# Patient Record
Sex: FEMALE | Race: WHITE | NOT HISPANIC OR LATINO | Employment: OTHER | URBAN - METROPOLITAN AREA
[De-identification: names, ages, dates, MRNs, and addresses within clinical notes are randomized per-mention and may not be internally consistent; named-entity substitution may affect disease eponyms.]

---

## 2023-12-30 ENCOUNTER — APPOINTMENT (OUTPATIENT)
Dept: GENERAL RADIOLOGY | Facility: HOSPITAL | Age: 63
End: 2023-12-30
Payer: COMMERCIAL

## 2023-12-30 ENCOUNTER — ANESTHESIA (OUTPATIENT)
Dept: PERIOP | Facility: HOSPITAL | Age: 63
End: 2023-12-30
Payer: COMMERCIAL

## 2023-12-30 ENCOUNTER — ANESTHESIA EVENT (OUTPATIENT)
Dept: PERIOP | Facility: HOSPITAL | Age: 63
End: 2023-12-30
Payer: COMMERCIAL

## 2023-12-30 ENCOUNTER — HOSPITAL ENCOUNTER (INPATIENT)
Facility: HOSPITAL | Age: 63
LOS: 2 days | Discharge: HOME | End: 2024-01-01
Attending: FAMILY MEDICINE | Admitting: INTERNAL MEDICINE
Payer: COMMERCIAL

## 2023-12-30 DIAGNOSIS — N20.1 RIGHT URETERAL STONE: Primary | ICD-10-CM

## 2023-12-30 PROBLEM — A41.9 SEPSIS: Status: ACTIVE | Noted: 2023-12-30

## 2023-12-30 LAB
ALBUMIN SERPL-MCNC: 2.7 G/DL (ref 3.5–5.2)
ALBUMIN/GLOB SERPL: 0.8 G/DL
ALP SERPL-CCNC: 119 U/L (ref 39–117)
ALT SERPL W P-5'-P-CCNC: 29 U/L (ref 1–33)
ANION GAP SERPL CALCULATED.3IONS-SCNC: 9.1 MMOL/L (ref 5–15)
AST SERPL-CCNC: 65 U/L (ref 1–32)
BACTERIA UR QL AUTO: ABNORMAL /HPF
BASOPHILS # BLD AUTO: 0.04 10*3/MM3 (ref 0–0.2)
BASOPHILS NFR BLD AUTO: 0.3 % (ref 0–1.5)
BILIRUB SERPL-MCNC: 1.9 MG/DL (ref 0–1.2)
BILIRUB UR QL STRIP: NEGATIVE
BUN SERPL-MCNC: 13 MG/DL (ref 8–23)
BUN/CREAT SERPL: 10 (ref 7–25)
CALCIUM SPEC-SCNC: 8.1 MG/DL (ref 8.6–10.5)
CHLORIDE SERPL-SCNC: 105 MMOL/L (ref 98–107)
CLARITY UR: ABNORMAL
CO2 SERPL-SCNC: 21.9 MMOL/L (ref 22–29)
COLOR UR: ABNORMAL
CREAT SERPL-MCNC: 1.3 MG/DL (ref 0.57–1)
D-LACTATE SERPL-SCNC: 1.5 MMOL/L (ref 0.5–2)
DEPRECATED RDW RBC AUTO: 51.7 FL (ref 37–54)
EGFRCR SERPLBLD CKD-EPI 2021: 46.3 ML/MIN/1.73
EOSINOPHIL # BLD AUTO: 0.04 10*3/MM3 (ref 0–0.4)
EOSINOPHIL NFR BLD AUTO: 0.3 % (ref 0.3–6.2)
ERYTHROCYTE [DISTWIDTH] IN BLOOD BY AUTOMATED COUNT: 14.3 % (ref 12.3–15.4)
GLOBULIN UR ELPH-MCNC: 3.5 GM/DL
GLUCOSE SERPL-MCNC: 113 MG/DL (ref 65–99)
GLUCOSE UR STRIP-MCNC: NEGATIVE MG/DL
HCT VFR BLD AUTO: 42.6 % (ref 34–46.6)
HGB BLD-MCNC: 14 G/DL (ref 12–15.9)
HGB UR QL STRIP.AUTO: ABNORMAL
HYALINE CASTS UR QL AUTO: ABNORMAL /LPF
IMM GRANULOCYTES # BLD AUTO: 0.06 10*3/MM3 (ref 0–0.05)
IMM GRANULOCYTES NFR BLD AUTO: 0.4 % (ref 0–0.5)
KETONES UR QL STRIP: NEGATIVE
LEUKOCYTE ESTERASE UR QL STRIP.AUTO: ABNORMAL
LYMPHOCYTES # BLD AUTO: 1.37 10*3/MM3 (ref 0.7–3.1)
LYMPHOCYTES NFR BLD AUTO: 9.1 % (ref 19.6–45.3)
MCH RBC QN AUTO: 31.9 PG (ref 26.6–33)
MCHC RBC AUTO-ENTMCNC: 32.9 G/DL (ref 31.5–35.7)
MCV RBC AUTO: 97 FL (ref 79–97)
MONOCYTES # BLD AUTO: 1.16 10*3/MM3 (ref 0.1–0.9)
MONOCYTES NFR BLD AUTO: 7.7 % (ref 5–12)
NEUTROPHILS NFR BLD AUTO: 12.43 10*3/MM3 (ref 1.7–7)
NEUTROPHILS NFR BLD AUTO: 82.2 % (ref 42.7–76)
NITRITE UR QL STRIP: NEGATIVE
NRBC BLD AUTO-RTO: 0 /100 WBC (ref 0–0.2)
PH UR STRIP.AUTO: 6 [PH] (ref 5–8)
PLATELET # BLD AUTO: 106 10*3/MM3 (ref 140–450)
PMV BLD AUTO: 10.7 FL (ref 6–12)
POTASSIUM SERPL-SCNC: 3.9 MMOL/L (ref 3.5–5.2)
PROCALCITONIN SERPL-MCNC: 0.77 NG/ML (ref 0–0.25)
PROT SERPL-MCNC: 6.2 G/DL (ref 6–8.5)
PROT UR QL STRIP: ABNORMAL
RBC # BLD AUTO: 4.39 10*6/MM3 (ref 3.77–5.28)
RBC # UR STRIP: ABNORMAL /HPF
REF LAB TEST METHOD: ABNORMAL
SODIUM SERPL-SCNC: 136 MMOL/L (ref 136–145)
SP GR UR STRIP: 1.02 (ref 1–1.03)
SQUAMOUS #/AREA URNS HPF: ABNORMAL /HPF
UROBILINOGEN UR QL STRIP: ABNORMAL
WBC # UR STRIP: ABNORMAL /HPF
WBC NRBC COR # BLD AUTO: 15.1 10*3/MM3 (ref 3.4–10.8)

## 2023-12-30 PROCEDURE — 25010000002 ONDANSETRON PER 1 MG: Performed by: FAMILY MEDICINE

## 2023-12-30 PROCEDURE — 99222 1ST HOSP IP/OBS MODERATE 55: CPT | Performed by: UROLOGY

## 2023-12-30 PROCEDURE — 25010000002 CEFTRIAXONE PER 250 MG: Performed by: UROLOGY

## 2023-12-30 PROCEDURE — 87086 URINE CULTURE/COLONY COUNT: CPT | Performed by: UROLOGY

## 2023-12-30 PROCEDURE — 83605 ASSAY OF LACTIC ACID: CPT | Performed by: FAMILY MEDICINE

## 2023-12-30 PROCEDURE — 87040 BLOOD CULTURE FOR BACTERIA: CPT | Performed by: FAMILY MEDICINE

## 2023-12-30 PROCEDURE — 0T768DZ DILATION OF RIGHT URETER WITH INTRALUMINAL DEVICE, VIA NATURAL OR ARTIFICIAL OPENING ENDOSCOPIC: ICD-10-PCS | Performed by: UROLOGY

## 2023-12-30 PROCEDURE — 25010000002 MORPHINE PER 10 MG: Performed by: UROLOGY

## 2023-12-30 PROCEDURE — 25010000002 ONDANSETRON PER 1 MG: Performed by: UROLOGY

## 2023-12-30 PROCEDURE — 52332 CYSTOSCOPY AND TREATMENT: CPT | Performed by: UROLOGY

## 2023-12-30 PROCEDURE — 80053 COMPREHEN METABOLIC PANEL: CPT | Performed by: FAMILY MEDICINE

## 2023-12-30 PROCEDURE — C1769 GUIDE WIRE: HCPCS | Performed by: UROLOGY

## 2023-12-30 PROCEDURE — 25010000002 PROPOFOL 10 MG/ML EMULSION: Performed by: NURSE ANESTHETIST, CERTIFIED REGISTERED

## 2023-12-30 PROCEDURE — C1758 CATHETER, URETERAL: HCPCS | Performed by: UROLOGY

## 2023-12-30 PROCEDURE — 85025 COMPLETE CBC W/AUTO DIFF WBC: CPT | Performed by: FAMILY MEDICINE

## 2023-12-30 PROCEDURE — 25810000003 LACTATED RINGERS PER 1000 ML: Performed by: NURSE ANESTHETIST, CERTIFIED REGISTERED

## 2023-12-30 PROCEDURE — C2617 STENT, NON-COR, TEM W/O DEL: HCPCS | Performed by: UROLOGY

## 2023-12-30 PROCEDURE — 76000 FLUOROSCOPY <1 HR PHYS/QHP: CPT

## 2023-12-30 PROCEDURE — 25810000003 SODIUM CHLORIDE 0.9 % SOLUTION: Performed by: INTERNAL MEDICINE

## 2023-12-30 PROCEDURE — 84145 PROCALCITONIN (PCT): CPT | Performed by: FAMILY MEDICINE

## 2023-12-30 PROCEDURE — 81001 URINALYSIS AUTO W/SCOPE: CPT | Performed by: UROLOGY

## 2023-12-30 PROCEDURE — 99223 1ST HOSP IP/OBS HIGH 75: CPT | Performed by: INTERNAL MEDICINE

## 2023-12-30 DEVICE — URETERAL STENT
Type: IMPLANTABLE DEVICE | Site: URETER | Status: FUNCTIONAL
Brand: ASCERTA™

## 2023-12-30 RX ORDER — AMOXICILLIN 250 MG
2 CAPSULE ORAL 2 TIMES DAILY
Status: DISCONTINUED | OUTPATIENT
Start: 2023-12-30 | End: 2023-12-31

## 2023-12-30 RX ORDER — SODIUM CHLORIDE, SODIUM LACTATE, POTASSIUM CHLORIDE, CALCIUM CHLORIDE 600; 310; 30; 20 MG/100ML; MG/100ML; MG/100ML; MG/100ML
INJECTION, SOLUTION INTRAVENOUS CONTINUOUS PRN
Status: DISCONTINUED | OUTPATIENT
Start: 2023-12-30 | End: 2023-12-30 | Stop reason: SURG

## 2023-12-30 RX ORDER — ACETAMINOPHEN 650 MG/1
650 SUPPOSITORY RECTAL EVERY 4 HOURS PRN
Status: DISCONTINUED | OUTPATIENT
Start: 2023-12-30 | End: 2024-01-01 | Stop reason: HOSPADM

## 2023-12-30 RX ORDER — PROPOFOL 10 MG/ML
VIAL (ML) INTRAVENOUS CONTINUOUS PRN
Status: DISCONTINUED | OUTPATIENT
Start: 2023-12-30 | End: 2023-12-30 | Stop reason: SURG

## 2023-12-30 RX ORDER — BISACODYL 5 MG/1
5 TABLET, DELAYED RELEASE ORAL DAILY PRN
Status: DISCONTINUED | OUTPATIENT
Start: 2023-12-30 | End: 2023-12-31

## 2023-12-30 RX ORDER — METOPROLOL TARTRATE 1 MG/ML
INJECTION, SOLUTION INTRAVENOUS AS NEEDED
Status: DISCONTINUED | OUTPATIENT
Start: 2023-12-30 | End: 2023-12-30 | Stop reason: SURG

## 2023-12-30 RX ORDER — ACETAMINOPHEN 325 MG/1
650 TABLET ORAL EVERY 4 HOURS PRN
Status: DISCONTINUED | OUTPATIENT
Start: 2023-12-30 | End: 2024-01-01 | Stop reason: HOSPADM

## 2023-12-30 RX ORDER — SODIUM CHLORIDE 0.9 % (FLUSH) 0.9 %
10 SYRINGE (ML) INJECTION AS NEEDED
Status: DISCONTINUED | OUTPATIENT
Start: 2023-12-30 | End: 2024-01-01 | Stop reason: HOSPADM

## 2023-12-30 RX ORDER — PROMETHAZINE HYDROCHLORIDE 25 MG/1
25 SUPPOSITORY RECTAL ONCE AS NEEDED
Status: DISCONTINUED | OUTPATIENT
Start: 2023-12-30 | End: 2023-12-30 | Stop reason: HOSPADM

## 2023-12-30 RX ORDER — PROMETHAZINE HYDROCHLORIDE 12.5 MG/1
25 TABLET ORAL ONCE AS NEEDED
Status: DISCONTINUED | OUTPATIENT
Start: 2023-12-30 | End: 2023-12-30 | Stop reason: HOSPADM

## 2023-12-30 RX ORDER — BISACODYL 10 MG
10 SUPPOSITORY, RECTAL RECTAL DAILY PRN
Status: DISCONTINUED | OUTPATIENT
Start: 2023-12-30 | End: 2023-12-31

## 2023-12-30 RX ORDER — POLYETHYLENE GLYCOL 3350 17 G/17G
17 POWDER, FOR SOLUTION ORAL DAILY PRN
Status: DISCONTINUED | OUTPATIENT
Start: 2023-12-30 | End: 2023-12-31

## 2023-12-30 RX ORDER — ONDANSETRON 2 MG/ML
4 INJECTION INTRAMUSCULAR; INTRAVENOUS ONCE AS NEEDED
Status: DISCONTINUED | OUTPATIENT
Start: 2023-12-30 | End: 2023-12-30 | Stop reason: HOSPADM

## 2023-12-30 RX ORDER — LIDOCAINE HYDROCHLORIDE 20 MG/ML
INJECTION, SOLUTION EPIDURAL; INFILTRATION; INTRACAUDAL; PERINEURAL AS NEEDED
Status: DISCONTINUED | OUTPATIENT
Start: 2023-12-30 | End: 2023-12-30 | Stop reason: SURG

## 2023-12-30 RX ORDER — MECOBALAMIN 5000 MCG
15 TABLET,DISINTEGRATING ORAL DAILY
COMMUNITY

## 2023-12-30 RX ORDER — SODIUM CHLORIDE 9 MG/ML
40 INJECTION, SOLUTION INTRAVENOUS AS NEEDED
Status: DISCONTINUED | OUTPATIENT
Start: 2023-12-30 | End: 2024-01-01 | Stop reason: HOSPADM

## 2023-12-30 RX ORDER — OXYCODONE HYDROCHLORIDE 5 MG/1
5 TABLET ORAL
Status: DISCONTINUED | OUTPATIENT
Start: 2023-12-30 | End: 2023-12-30 | Stop reason: HOSPADM

## 2023-12-30 RX ORDER — ACETAMINOPHEN 160 MG/5ML
650 SOLUTION ORAL EVERY 4 HOURS PRN
Status: DISCONTINUED | OUTPATIENT
Start: 2023-12-30 | End: 2024-01-01 | Stop reason: HOSPADM

## 2023-12-30 RX ORDER — ONDANSETRON 2 MG/ML
4 INJECTION INTRAMUSCULAR; INTRAVENOUS EVERY 6 HOURS PRN
Status: DISCONTINUED | OUTPATIENT
Start: 2023-12-30 | End: 2024-01-01 | Stop reason: HOSPADM

## 2023-12-30 RX ORDER — MAGNESIUM HYDROXIDE 1200 MG/15ML
LIQUID ORAL AS NEEDED
Status: DISCONTINUED | OUTPATIENT
Start: 2023-12-30 | End: 2023-12-30 | Stop reason: HOSPADM

## 2023-12-30 RX ORDER — SODIUM CHLORIDE 0.9 % (FLUSH) 0.9 %
10 SYRINGE (ML) INJECTION EVERY 12 HOURS SCHEDULED
Status: DISCONTINUED | OUTPATIENT
Start: 2023-12-30 | End: 2024-01-01 | Stop reason: HOSPADM

## 2023-12-30 RX ORDER — MEPERIDINE HYDROCHLORIDE 25 MG/ML
12.5 INJECTION INTRAMUSCULAR; INTRAVENOUS; SUBCUTANEOUS
Status: DISCONTINUED | OUTPATIENT
Start: 2023-12-30 | End: 2023-12-30 | Stop reason: HOSPADM

## 2023-12-30 RX ADMIN — LIDOCAINE HYDROCHLORIDE 100 MG: 20 INJECTION, SOLUTION EPIDURAL; INFILTRATION; INTRACAUDAL; PERINEURAL at 12:27

## 2023-12-30 RX ADMIN — Medication 10 ML: at 10:50

## 2023-12-30 RX ADMIN — Medication 10 ML: at 15:49

## 2023-12-30 RX ADMIN — PROPOFOL 50 MG: 10 INJECTION, EMULSION INTRAVENOUS at 12:28

## 2023-12-30 RX ADMIN — ONDANSETRON 4 MG: 2 INJECTION INTRAMUSCULAR; INTRAVENOUS at 17:31

## 2023-12-30 RX ADMIN — Medication 10 ML: at 20:55

## 2023-12-30 RX ADMIN — MORPHINE SULFATE 4 MG: 4 INJECTION, SOLUTION INTRAMUSCULAR; INTRAVENOUS at 17:39

## 2023-12-30 RX ADMIN — CEFTRIAXONE SODIUM 2000 MG: 2 INJECTION, POWDER, FOR SOLUTION INTRAMUSCULAR; INTRAVENOUS at 12:26

## 2023-12-30 RX ADMIN — PROPOFOL 50 MG: 10 INJECTION, EMULSION INTRAVENOUS at 12:27

## 2023-12-30 RX ADMIN — ONDANSETRON 4 MG: 2 INJECTION INTRAMUSCULAR; INTRAVENOUS at 12:37

## 2023-12-30 RX ADMIN — METOPROLOL TARTRATE 3 MG: 1 INJECTION, SOLUTION INTRAVENOUS at 12:53

## 2023-12-30 RX ADMIN — SODIUM CHLORIDE 1000 ML: 9 INJECTION, SOLUTION INTRAVENOUS at 10:48

## 2023-12-30 RX ADMIN — PROPOFOL 125 MCG/KG/MIN: 10 INJECTION, EMULSION INTRAVENOUS at 12:25

## 2023-12-30 RX ADMIN — SODIUM CHLORIDE, POTASSIUM CHLORIDE, SODIUM LACTATE AND CALCIUM CHLORIDE: 600; 310; 30; 20 INJECTION, SOLUTION INTRAVENOUS at 12:06

## 2023-12-30 RX ADMIN — ACETAMINOPHEN 650 MG: 325 TABLET ORAL at 22:07

## 2023-12-30 NOTE — ANESTHESIA POSTPROCEDURE EVALUATION
Patient: Gin Hunter    Procedure Summary       Date: 12/30/23 Room / Location: [facility] OR 07 / [facility] MAIN OR    Anesthesia Start: 1224 Anesthesia Stop: 1251    Procedure: CYSTOSCOPY, RIGHT URETERAL STENT INSERTION (Right: Ureter) Diagnosis:       Right ureteral stone      (Right ureteral stone [N20.1])    Surgeons: Jeaneth Lemon MD Provider: Steven Steven MD    Anesthesia Type: general ASA Status: 2            Anesthesia Type: general    Vitals  Vitals Value Taken Time   /61 12/30/23 1250   Temp 37.6 °C (99.6 °F) 12/30/23 1250   Pulse 105 12/30/23 1255   Resp 22 12/30/23 1250   SpO2 99 % 12/30/23 1255   Vitals shown include unfiled device data.        Post Anesthesia Care and Evaluation    Patient location during evaluation: bedside  Patient participation: complete - patient participated  Level of consciousness: awake and alert  Pain management: adequate    Airway patency: patent  Anesthetic complications: No anesthetic complications  PONV Status: none  Cardiovascular status: acceptable and tachycardic (brief run of tachy to 150s, regular.  Tx with 3 mg lopressor, ekg unchanged from preop)  Respiratory status: acceptable  Hydration status: acceptable    Comments: An Anesthesiologist personally participated in the most demanding procedures (including induction and emergence if applicable) in the anesthesia plan, monitored the course of anesthesia administration at frequent intervals and remained physically present and available for immediate diagnosis and treatment of emergencies.

## 2023-12-30 NOTE — CONSULTS
[facility]   Consult Note    Patient Name: Gin Hunter  : 1960  MRN: [id number]  Primary Care Physician:  Amber Colin APRN  Referring Physician: No Known Provider  Date of admission: 2023    Consults  Subjective   Subjective     Reason for Consult/ Chief Complaint: Right ureteral stone, Acute right pyelonephritis.    History of Present Illness  Gin Hunter is a 63 y.o. female Pt. Admitted sent over from [facility] with severe right flank pain and fever and nausea and vomiting. CT scan at [facility] showed a 6 mm stone obstructing the right kidney at the right proximal ureter.She has not had stones before.  Pt. Is not on any medications.    Review of Systems   10 point review systems reviewed and it is negative.  Personal History     Past Medical History:   Diagnosis Date    Cancer     GERD (gastroesophageal reflux disease)        Past Surgical History:   Procedure Laterality Date     SECTION         Family History: family history includes Cancer in her father and mother; Diabetes in her father; Diabetes type I in her father; Emphysema in her brother; Hypertension in her father and mother; Nephrolithiasis in her daughter. Otherwise pertinent FHx was reviewed and not pertinent to current issue.    Social History:  reports that she has been smoking cigarettes. She started smoking about 45 years ago. She has been smoking an average of 1 pack per day. She has never used smokeless tobacco. She reports current alcohol use of about 2.0 standard drinks of alcohol per week. Drug use questions deferred to the physician.    Home Medications:        Allergies:  Allergies   Allergen Reactions    Penicillin G Anaphylaxis       Objective    Objective     Vitals:  Temp:  [99 °F (37.2 °C)] 99 °F (37.2 °C)  Heart Rate:  [114] 114  Resp:  [20] 20  BP: (91)/(51) 91/51    Physical Exam  Awake alert oriented x 3.  [id number]  VSS  HEENT:  EOMI, oropharynic clear neck is supple.  CV:   RRR  Lungs.  Normal expiration and inspiration without shortness of breath or pain.  ABD:  soft non tender no rebound no guarding no rigidity.  Right CVAT.  None on left.  Ext:  No clubbing cyanosis or edema.  Neuro:  Intact no deficits.    Result Review    Result Review:  I have personally reviewed the results from the time of this admission to 12/30/2023 10:42 EST and agree with these findings:  [x]  Laboratory list / accordion  []  Microbiology  [x]  Radiology  []  EKG/Telemetry   []  Cardiology/Vascular   []  Pathology  []  Old records  []  Other:  Most notable findings include: 6 mm Right proximal ureteral stone.  U/A:  4 + bacteria, 21 to 50 wbc, Nit positive and Leuk est +3.        Assessment & Plan   Assessment / Plan     Brief Patient Summary:  Gin Hunter is a 63 y.o. female who Six mm right proximal ureteral stone with infection and early sepsis.    Active Hospital Problems:  Active Hospital Problems    Diagnosis     **Sepsis      Plan:   Pt. Needs to have cystoscopy and right ureteral stent placement urgently.  I at length discussed with her all the risks benefits alternatives all potential complications of watchful waiting vs.  Proceeding with above mentioned procedure.  She understands our discussion and voices her understanding of our discussion and wishes to proceed with procedure. In the near future when the infection has cleared she will need ureteroscopy and laser litho. She understands that.    Jeaneth Lemon MD

## 2023-12-30 NOTE — H&P
[facility]  [facility]   Patient Name: Gin Hunter  : 1960  MRN: [id number]  Primary Care Physician:  Amber Colin APRN  Date of admission: 2023    Subjective   Subjective     Chief Complaint: Transfer due to obstructive kidney stone    HPI:    Gin Hunter is a 63 y.o. female past medical history of hypertension and obesity who was transferred from outside hospital due to acute kidney stone causing obstructive uropathy    Patient states she is in her normal state of health.  Until last night when she developed right flank pain.  As result she presented to the outside hospital for further evaluation.  She does state having fevers and chills    At [facility], her temperature was 100.4 and she was tachycardic prior the chart.  CBC showed a white blood cell count of 5.6 and a hemoglobin of 14.1.  Her platelets are 98.  CMP shows a creatinine of 0.8, glucose 10, and albumin of 2.4 and a total bilirubin of 1.5.  Urinalysis shows 3+ leuk esterase.  Positive nitrite.  4+ bacteria.  She was given 2 g of ceftriaxone.  It is unclear if she got any fluid resuscitation.  She was transferred to [facility] for management of obstructive stone and urinary sepsis.  Urology was consulted and they will take the patient to the OR for further management.    All systems reviewed abnormalities noted above    Personal History     Past Medical History:  Hypertension    Past Surgical History:   section    Family History:   Family history of kidney stones    Social History:   Smokes daily  2 drinks per week.    Home Medications:       Allergies:  Allergies   Allergen Reactions    Penicillin G Anaphylaxis       Objective   Objective     Vitals:   Temp:  [99 °F (37.2 °C)] 99 °F (37.2 °C)  Heart Rate:  [114] 114  Resp:  [20] 20  BP: (91)/(51) 91/51    Physical Exam    Constitutional: Chronically ill-appearing.   Eyes: Pupils equal, sclerae anicteric, no  conjunctival injection   HENT: NCAT, mucous membranes moist   Neck: Supple, no thyromegaly, no lymphadenopathy, trachea midline   Respiratory: Clear to auscultation bilaterally, nonlabored respirations    Cardiovascular: RRR, no murmurs, rubs, or gallops, palpable pedal pulses bilaterally   Gastrointestinal: Positive bowel sounds, soft, nontender, nondistended.  Right flank pain   Musculoskeletal: Trace edema no clubbing or cyanosis to extremities.  Chronic venous changes    Psychiatric: Appropriate affect, cooperative   Neurologic: Oriented x 3, strength symmetric in all extremities, Cranial Nerves grossly intact to confrontation, speech clear   Skin: No rashes     Result Review    Result Review:  I have personally reviewed the results from the time of this admission to 12/30/2023 10:38 EST and agree with these findings:  Bicarb of 21.9, creatinine 1.30, bili 1.9, Pro-Flavio 0.77  Leukocytosis of 15    Assessment & Plan   Assessment / Plan     Assessment/Plan:   Sepsis due to obstructive uropathy and pyelonephritis (leukocytosis of and tachycardia)  Pyelonephritis  Obstructive uropathy  History of hypertension  Hypoalbuminemia, hyperbilirubinemia and mild thrombocytopenia with no formal diagnosis of cirrhosis of the liver?  Elevated LFTs  Acute kidney injury with baseline creatinine 0.8 currently 1.3    Plan:  --Admit to hospital service  -- Consult urology  -- Continue ceftriaxone 2 g daily for concern of bacteremia  -- Blood culture  -- Urine culture  --Right upper ultrasound to rule out cirrhosis  -- IV morphine for pain control  -- 2 L of normal saline bolus  -- Avoid nephrotoxic agents  -- Repeat CMP in the morning        DVT prophylaxis:  Lovenox    CODE STATUS:    Code Status (Patient has no pulse and is not breathing): CPR (Attempt to Resuscitate)  Medical Interventions (Patient has pulse or is breathing): Full Support      Admission Status:  I believe this patient meets admission status.    Electronically  signed by Genaro Pierce MD, 12/30/23, 10:38 AM EST.

## 2023-12-30 NOTE — OP NOTE
CYSTOSCOPY URETERAL CATHETER/STENT INSERTION  Procedure Report    Patient Name:  Gin Hunter  YOB: 1960    Date of Surgery:  12/30/2023     Indications: 7 mm right proximal ureteral stone, acute right pyelonephritis    Pre-op Diagnosis:   Right ureteral stone [N20.1]       Post-Op Diagnosis Codes:     * Right ureteral stone [N20.1]    Procedure/CPT® Codes:      Procedure(s):  CYSTOSCOPY, RIGHT URETERAL STENT INSERTION        Staff:  Surgeon(s):  Jeaneth Lemon MD         Anesthesia: General    Estimated Blood Loss: 0 mL    Implants:    Implant Name Type Inv. Item Serial No.  Lot No. LRB No. Used Action   STNT URETRL ASCERTA 6F 26CM - [id number] Stent STNT URETRL ASCERTA 6F 26CM  [employer] [id number] Right 1 Implanted       Specimen:          None        Findings: High-grade obstruction right side with 7 mm right proximal ureteral stone    Complications: None    Description of Procedure: Indication for procedure: Patient was admitted yesterday to [facility] with severe right flank pain nausea vomiting and fevers and chills.  Patient was soon transferred to [facility] where she arrived this morning.  We are going to place a right ureteral stent.  Description of procedure: After informed consent was obtained patient was taken to the operating room.  General anesthesia was administered.  Patient was placed in a dorsolithotomy position.  Groin is prepped and draped in a sterile fashion.  Cystoscopy is performed bladder is filled with very purulent urine were visibility was very poor I had to flush the bladder out a couple of times in order to gain good visibility both ureteral orifices were normal.  As was the bladder.  Fluoroscopy showed high-grade obstruction on the right side with a right renal pelvis was filled with contrast with a column of contrast culminating to the proximal ureteral stone on the right side.  The ureteral catheter was passed into the right  distal ureter a sensor tip guidewire was passed through it into the right kidney past the stone.  The ureteral catheter was removed and a 6 [place] by 24 cm stent was passed fluoroscopy showed that the stent was not long enough as the proximal end was buried in the renal pelvis so I decided to remove the 24 cm stent and instead passed a 6 [place] by 26 cm stent into the right kidney fluoroscopy showed excellent position of the proximal and cystoscopy showed the distal end to be in good shape in the bladder.  Bladder was drained cystoscope was removed patient tolerated the procedure well there were no complications she was stable awake alert in good condition when transported to recovery room.                  Jeaneth Lemon MD     Date: 12/30/2023  Time: 12:44 EST

## 2023-12-30 NOTE — PROGRESS NOTES
Patient is a 63-year-old female who presented to [facility] complaining of a 2-day history of increasing right flank pain, dysuria, fever and chills.  Past medical history of hypertension.  In the ED there her urine analysis showed 4+ bacteria 3+ leukocyte esterase 3+ blood with a axillary temp of 100.4.  Patient CT scan of the abdomen and pelvis showed a 6 mm proximal right ureteral stone causing moderate hydro.  Creatinine was not bumped at 0.8.  She received 2 g of Rocephin..  The emergency room physician spoke with urology who asked to be notified as soon as she arrives so that he can take to surgery  Patient's vital signs are otherwise stable

## 2023-12-30 NOTE — ANESTHESIA PREPROCEDURE EVALUATION
Anesthesia Evaluation     Patient summary reviewed and Nursing notes reviewed   no history of anesthetic complications:   NPO Solid Status: > 8 hours  NPO Liquid Status: > 2 hours           Airway   Mallampati: II  TM distance: >3 FB  Neck ROM: full  No difficulty expected  Dental    (+) poor dentition        Pulmonary - negative pulmonary ROS and normal exam    breath sounds clear to auscultation  Cardiovascular - normal exam  Exercise tolerance: good (4-7 METS)    Rhythm: regular  Rate: normal        Neuro/Psych- negative ROS  GI/Hepatic/Renal/Endo    (+) GERD well controlled    Musculoskeletal (-) negative ROS    Abdominal    Substance History - negative use     OB/GYN negative ob/gyn ROS         Other      history of cancer    ROS/Med Hx Other:                  Anesthesia Plan    ASA 2     general   total IV anesthesia  (Patient understands anesthesia not responsible for dental damage.  12/30/23 10:44  WBC: 15.10 (H)  RBC: 4.39  Hemoglobin: 14.0  Hematocrit: 42.6  Platelets: 106 (L)      (H): Data is abnormally high  (L): Data is abnormally low)  intravenous induction     Anesthetic plan, risks, benefits, and alternatives have been provided, discussed and informed consent has been obtained with: patient.  Pre-procedure education provided  Plan discussed with CRNA.      CODE STATUS:    Code Status (Patient has no pulse and is not breathing): CPR (Attempt to Resuscitate)  Medical Interventions (Patient has pulse or is breathing): Full Support

## 2023-12-30 NOTE — PLAN OF CARE
Goal Outcome Evaluation:  Plan of Care Reviewed With: patient           Outcome Evaluation: Patient came back to the floor in afternoon once stent was placed. Pain treated as needed. She voids without discomfort. slightly elevated temp throughout the shift. Continue to monitor.

## 2023-12-31 ENCOUNTER — APPOINTMENT (OUTPATIENT)
Dept: ULTRASOUND IMAGING | Facility: HOSPITAL | Age: 63
End: 2023-12-31
Payer: COMMERCIAL

## 2023-12-31 ENCOUNTER — APPOINTMENT (OUTPATIENT)
Dept: GENERAL RADIOLOGY | Facility: HOSPITAL | Age: 63
End: 2023-12-31
Payer: COMMERCIAL

## 2023-12-31 PROBLEM — N17.9 AKI (ACUTE KIDNEY INJURY): Status: ACTIVE | Noted: 2023-12-31

## 2023-12-31 PROBLEM — N10 ACUTE PYELONEPHRITIS: Status: ACTIVE | Noted: 2023-12-31

## 2023-12-31 LAB
ALBUMIN SERPL-MCNC: 2.4 G/DL (ref 3.5–5.2)
ALBUMIN/GLOB SERPL: 0.8 G/DL
ALP SERPL-CCNC: 103 U/L (ref 39–117)
ALT SERPL W P-5'-P-CCNC: 23 U/L (ref 1–33)
ANION GAP SERPL CALCULATED.3IONS-SCNC: 7.6 MMOL/L (ref 5–15)
AST SERPL-CCNC: 54 U/L (ref 1–32)
BACTERIA SPEC AEROBE CULT: NO GROWTH
BASOPHILS # BLD AUTO: 0.04 10*3/MM3 (ref 0–0.2)
BASOPHILS NFR BLD AUTO: 0.5 % (ref 0–1.5)
BILIRUB SERPL-MCNC: 1.6 MG/DL (ref 0–1.2)
BUN SERPL-MCNC: 13 MG/DL (ref 8–23)
BUN/CREAT SERPL: 14.3 (ref 7–25)
CALCIUM SPEC-SCNC: 7.9 MG/DL (ref 8.6–10.5)
CHLORIDE SERPL-SCNC: 108 MMOL/L (ref 98–107)
CO2 SERPL-SCNC: 22.4 MMOL/L (ref 22–29)
CREAT SERPL-MCNC: 0.91 MG/DL (ref 0.57–1)
DEPRECATED RDW RBC AUTO: 51.5 FL (ref 37–54)
EGFRCR SERPLBLD CKD-EPI 2021: 71 ML/MIN/1.73
EOSINOPHIL # BLD AUTO: 0.1 10*3/MM3 (ref 0–0.4)
EOSINOPHIL NFR BLD AUTO: 1.2 % (ref 0.3–6.2)
ERYTHROCYTE [DISTWIDTH] IN BLOOD BY AUTOMATED COUNT: 14.3 % (ref 12.3–15.4)
GLOBULIN UR ELPH-MCNC: 3.2 GM/DL
GLUCOSE SERPL-MCNC: 101 MG/DL (ref 65–99)
HCT VFR BLD AUTO: 38.3 % (ref 34–46.6)
HGB BLD-MCNC: 12.4 G/DL (ref 12–15.9)
IMM GRANULOCYTES # BLD AUTO: 0.03 10*3/MM3 (ref 0–0.05)
IMM GRANULOCYTES NFR BLD AUTO: 0.4 % (ref 0–0.5)
LYMPHOCYTES # BLD AUTO: 1.57 10*3/MM3 (ref 0.7–3.1)
LYMPHOCYTES NFR BLD AUTO: 19 % (ref 19.6–45.3)
MAGNESIUM SERPL-MCNC: 1.8 MG/DL (ref 1.6–2.4)
MCH RBC QN AUTO: 31.8 PG (ref 26.6–33)
MCHC RBC AUTO-ENTMCNC: 32.4 G/DL (ref 31.5–35.7)
MCV RBC AUTO: 98.2 FL (ref 79–97)
MONOCYTES # BLD AUTO: 0.85 10*3/MM3 (ref 0.1–0.9)
MONOCYTES NFR BLD AUTO: 10.3 % (ref 5–12)
NEUTROPHILS NFR BLD AUTO: 5.68 10*3/MM3 (ref 1.7–7)
NEUTROPHILS NFR BLD AUTO: 68.6 % (ref 42.7–76)
NRBC BLD AUTO-RTO: 0 /100 WBC (ref 0–0.2)
PLATELET # BLD AUTO: 85 10*3/MM3 (ref 140–450)
PMV BLD AUTO: 10.7 FL (ref 6–12)
POTASSIUM SERPL-SCNC: 3.6 MMOL/L (ref 3.5–5.2)
PROT SERPL-MCNC: 5.6 G/DL (ref 6–8.5)
RBC # BLD AUTO: 3.9 10*6/MM3 (ref 3.77–5.28)
SODIUM SERPL-SCNC: 138 MMOL/L (ref 136–145)
WBC NRBC COR # BLD AUTO: 8.27 10*3/MM3 (ref 3.4–10.8)

## 2023-12-31 PROCEDURE — 85025 COMPLETE CBC W/AUTO DIFF WBC: CPT | Performed by: INTERNAL MEDICINE

## 2023-12-31 PROCEDURE — 80053 COMPREHEN METABOLIC PANEL: CPT | Performed by: INTERNAL MEDICINE

## 2023-12-31 PROCEDURE — 76705 ECHO EXAM OF ABDOMEN: CPT

## 2023-12-31 PROCEDURE — 99231 SBSQ HOSP IP/OBS SF/LOW 25: CPT | Performed by: UROLOGY

## 2023-12-31 PROCEDURE — 71046 X-RAY EXAM CHEST 2 VIEWS: CPT

## 2023-12-31 PROCEDURE — 83735 ASSAY OF MAGNESIUM: CPT | Performed by: INTERNAL MEDICINE

## 2023-12-31 PROCEDURE — 25010000002 ONDANSETRON PER 1 MG: Performed by: UROLOGY

## 2023-12-31 PROCEDURE — 25010000002 KETOROLAC TROMETHAMINE PER 15 MG: Performed by: INTERNAL MEDICINE

## 2023-12-31 PROCEDURE — 25010000002 MORPHINE PER 10 MG: Performed by: UROLOGY

## 2023-12-31 PROCEDURE — 99232 SBSQ HOSP IP/OBS MODERATE 35: CPT | Performed by: INTERNAL MEDICINE

## 2023-12-31 PROCEDURE — 25010000002 CEFTRIAXONE PER 250 MG: Performed by: UROLOGY

## 2023-12-31 PROCEDURE — 25010000002 HEPARIN (PORCINE) PER 1000 UNITS: Performed by: INTERNAL MEDICINE

## 2023-12-31 RX ORDER — KETOROLAC TROMETHAMINE 30 MG/ML
15 INJECTION, SOLUTION INTRAMUSCULAR; INTRAVENOUS EVERY 6 HOURS PRN
Status: DISCONTINUED | OUTPATIENT
Start: 2023-12-31 | End: 2024-01-01 | Stop reason: HOSPADM

## 2023-12-31 RX ORDER — AZITHROMYCIN 250 MG/1
500 TABLET, FILM COATED ORAL DAILY
Status: COMPLETED | OUTPATIENT
Start: 2023-12-31 | End: 2023-12-31

## 2023-12-31 RX ORDER — POLYETHYLENE GLYCOL 3350 17 G/17G
17 POWDER, FOR SOLUTION ORAL DAILY PRN
Status: DISCONTINUED | OUTPATIENT
Start: 2023-12-31 | End: 2024-01-01 | Stop reason: HOSPADM

## 2023-12-31 RX ORDER — AZITHROMYCIN 250 MG/1
250 TABLET, FILM COATED ORAL DAILY
Status: DISCONTINUED | OUTPATIENT
Start: 2024-01-01 | End: 2024-01-01 | Stop reason: HOSPADM

## 2023-12-31 RX ORDER — BISACODYL 5 MG/1
5 TABLET, DELAYED RELEASE ORAL DAILY PRN
Status: DISCONTINUED | OUTPATIENT
Start: 2023-12-31 | End: 2024-01-01 | Stop reason: HOSPADM

## 2023-12-31 RX ORDER — IBUPROFEN 400 MG/1
400 TABLET ORAL EVERY 4 HOURS PRN
Status: DISCONTINUED | OUTPATIENT
Start: 2023-12-31 | End: 2024-01-01 | Stop reason: HOSPADM

## 2023-12-31 RX ORDER — AMOXICILLIN 250 MG
2 CAPSULE ORAL NIGHTLY PRN
Status: DISCONTINUED | OUTPATIENT
Start: 2023-12-31 | End: 2024-01-01 | Stop reason: HOSPADM

## 2023-12-31 RX ORDER — ALBUTEROL SULFATE 2.5 MG/3ML
2.5 SOLUTION RESPIRATORY (INHALATION) EVERY 6 HOURS PRN
Status: DISCONTINUED | OUTPATIENT
Start: 2023-12-31 | End: 2024-01-01 | Stop reason: HOSPADM

## 2023-12-31 RX ORDER — HEPARIN SODIUM 5000 [USP'U]/ML
5000 INJECTION, SOLUTION INTRAVENOUS; SUBCUTANEOUS EVERY 8 HOURS SCHEDULED
Status: DISCONTINUED | OUTPATIENT
Start: 2023-12-31 | End: 2024-01-01 | Stop reason: HOSPADM

## 2023-12-31 RX ORDER — BISACODYL 10 MG
10 SUPPOSITORY, RECTAL RECTAL DAILY PRN
Status: DISCONTINUED | OUTPATIENT
Start: 2023-12-31 | End: 2024-01-01 | Stop reason: HOSPADM

## 2023-12-31 RX ADMIN — CEFTRIAXONE SODIUM 2000 MG: 2 INJECTION, POWDER, FOR SOLUTION INTRAMUSCULAR; INTRAVENOUS at 06:26

## 2023-12-31 RX ADMIN — KETOROLAC TROMETHAMINE 15 MG: 30 INJECTION, SOLUTION INTRAMUSCULAR; INTRAVENOUS at 11:43

## 2023-12-31 RX ADMIN — HEPARIN SODIUM 5000 UNITS: 5000 INJECTION INTRAVENOUS; SUBCUTANEOUS at 21:43

## 2023-12-31 RX ADMIN — ACETAMINOPHEN 650 MG: 650 SUPPOSITORY RECTAL at 02:20

## 2023-12-31 RX ADMIN — AZITHROMYCIN DIHYDRATE 500 MG: 250 TABLET ORAL at 15:32

## 2023-12-31 RX ADMIN — ONDANSETRON 4 MG: 2 INJECTION INTRAMUSCULAR; INTRAVENOUS at 02:20

## 2023-12-31 RX ADMIN — HEPARIN SODIUM 5000 UNITS: 5000 INJECTION INTRAVENOUS; SUBCUTANEOUS at 15:32

## 2023-12-31 RX ADMIN — MORPHINE SULFATE 4 MG: 4 INJECTION, SOLUTION INTRAMUSCULAR; INTRAVENOUS at 02:20

## 2023-12-31 RX ADMIN — ONDANSETRON 4 MG: 2 INJECTION INTRAMUSCULAR; INTRAVENOUS at 11:43

## 2023-12-31 RX ADMIN — Medication 10 ML: at 21:43

## 2023-12-31 RX ADMIN — Medication 10 ML: at 09:00

## 2023-12-31 NOTE — PROGRESS NOTES
[facility]   Hospitalist Progress Note  Date: 2023  Patient Name: Gin Hunter  : 1960  MRN: [id number]  Date of admission: 2023      Subjective   Subjective     Chief Complaint: Follow up for mgmt of obstructive kidney stone    Summary: 64 y/o F with HTN who was transferred from OSH for mgmt of pyelonephritis and obstructive kidney stone. At OSH, her temperature was 100.4 and she was tachycardic prior the chart.  CBC showed a white blood cell count of 5.6 and a hemoglobin of 14.1.  Her platelets are 98.  CMP shows a creatinine of 0.8, glucose 10, and albumin of 2.4 and a total bilirubin of 1.5.  Urinalysis shows 3+ leuk esterase.  Positive nitrite.  4+ bacteria.  She was given 2 g of ceftriaxone. Underwent cystoscopy with right ureteral stent insertion on .  Awaiting cultures.  Doing better.    Interval Followup:   Fever overnight, 103.1F  BP stable  SpO2 dropped to 87% on room air, now on 2L NC  WBC normalized  Denies fever or chills  Less flank pain  No dysuria or hematuria    Review of Systems  Cardiovascular:  No Chest Pain, No Edema  Respiratory:  +Cough, No Dyspnea, No wheezing  Gastrointestinal:  No Nausea, No Vomiting      Objective   Objective     Vitals:   Temp:  [98.4 °F (36.9 °C)-103.1 °F (39.5 °C)] 98.4 °F (36.9 °C)  Heart Rate:  [] 90  Resp:  [16-22] 16  BP: ()/(46-90) 120/60  Flow (L/min):  [2] 2  Physical Exam    Constitutional: Conversant, NAD   Eyes: Pupils equal and reactive, no conjunctival injections   HENT: NCAT, nares patent, MMM   Respiratory: Clear to auscultation bilaterally, nonlabored respirations    Cardiovascular: RRR, no murmurs, no edema   Gastrointestinal: Positive bowel sounds, soft, nontender, nondistended   Neurologic: Alert, Cranial Nerves grossly intact, speech clear   Skin: Extremities warm, no rashes or wounds    Result Review    Result Review:  I have personally reviewed the following over the last 24 hours (07:00 to 07:00) and  agree with the following findings  [x]  Laboratory  CBC          12/30/2023    10:44 12/31/2023    04:56   CBC   WBC 15.10  8.27    RBC 4.39  3.90    Hemoglobin 14.0  12.4    Hematocrit 42.6  38.3    MCV 97.0  98.2    MCH 31.9  31.8    MCHC 32.9  32.4    RDW 14.3  14.3    Platelets 106  85      CMP          12/30/2023    10:44 12/31/2023    04:56   CMP   Glucose 113  101    BUN 13  13    Creatinine 1.30  0.91    EGFR 46.3  71.0    Sodium 136  138    Potassium 3.9  3.6    Chloride 105  108    Calcium 8.1  7.9    Total Protein 6.2  5.6    Albumin 2.7  2.4    Globulin 3.5  3.2    Total Bilirubin 1.9  1.6    Alkaline Phosphatase 119  103    AST (SGOT) 65  54    ALT (SGPT) 29  23    Albumin/Globulin Ratio 0.8  0.8    BUN/Creatinine Ratio 10.0  14.3    Anion Gap 9.1  7.6      Procalcitonin 0.77    [x]  Microbiology  Blood culture pending  Urine culture pending  [x]  Radiology   []  EKG/Telemetry   []  Cardiology/Vascular   []  Pathology  []  Old records  [x]  Other:    Intake/Output Summary (Last 24 hours) at 12/31/2023 1058  Last data filed at 12/31/2023 0930  Gross per 24 hour   Intake 1320 ml   Output 1350 ml   Net -30 ml         Assessment & Plan   Assessment / Plan     Assessment/Plan:  Sepsis due to obstructive uropathy and pyelonephritis   Pyelonephritis  Obstructive uropathy  Hypoxia  Left lower lobe pneumonia from unspecified organism  Lifelong smoker  History of hypertension  Hypoalbuminemia, hyperbilirubinemia and mild thrombocytopenia with no formal diagnosis of cirrhosis of the liver?  Elevated LFTs  Acute kidney injury, resolved          Appreciate urology assistance. Will need outpatient follow up in 2-3 weeks.  Fevers coming down. WBC normalized. Blood and urine culture pending  PA/lateral chest x-ray with infiltrate in the left lower lobe with small pleural effusion.  Pro-Flavio 0.77.  Given new oxygen demand with cough and x-ray findings will add azithromycin to complete 5-day course and continue Rocephin 2  g daily  Albuterol nebs q6 hours/ Encourage I-S. Wean supplemental oxygen, SpO2 goal >90%  Monitor fever curve; tylenol q6 hours prn  Cr normalized. Over 1L UOP noted. Switch to IV Toradol 15 mg q6 hours severe pain  Declined NRT  Advance diet as tolerated  Add subcu heparin 5000 units for DVT ppx  CBC, BMP in AM    Discussed plan with RN.    DVT prophylaxis:  Mechanical DVT prophylaxis orders are present.    CODE STATUS:   Code Status (Patient has no pulse and is not breathing): CPR (Attempt to Resuscitate)  Medical Interventions (Patient has pulse or is breathing): Full Support    Electronically signed by Rickey Rollins DO, 12/31/23, 7:42 AM EST.

## 2023-12-31 NOTE — PROGRESS NOTES
[facility]     Progress Note    Patient Name: Gin Hunter  : 1960  MRN: [id number]  Primary Care Physician:  Amber Colin APRN  Date of admission: 2023    Subjective   Subjective     Chief Complaint: She has no complaints today    History of Present Illness  Patient Reports patient came to us from another [facility] with severe right flank pain fevers chills urinary tract infection.  She underwent right ureteral stent placement urgently yesterday.  Postoperatively patient has been doing well her pain is pretty much resolved but she did have a temperature of 103.1.  Currently she is afebrile vital signs are stable.    Review of Systems  Negative  Objective   Objective     Vitals:   Temp:  [98.4 °F (36.9 °C)-103.1 °F (39.5 °C)] 98.4 °F (36.9 °C)  Heart Rate:  [] 90  Resp:  [16-22] 16  BP: ()/(46-90) 120/60  Flow (L/min):  [2] 2    Physical Exam   Awake alert oriented x 3  Afebrile vital signs are stable last night did have a temperature of 103.1.  Abdomen soft nontender nondistended no rebound guarding or rigidity is noted  No CVA tenderness.  Extremities no clubbing cyanosis or edema.  Result Review    Result Review:  I have personally reviewed the results from the time of this admission to 2023 09:24 EST and agree with these findings:  [x]  Laboratory list / accordion  []  Microbiology  []  Radiology  []  EKG/Telemetry   []  Cardiology/Vascular   []  Pathology  []  Old records  []  Other:  Most notable findings include: Creatinine is 0.91 white blood cell count is 8.27      Assessment & Plan   Assessment / Plan     Brief Patient Summary:  Gin Hunter is a 63 y.o. female who patient was septic yesterday from a right proximal ureteral stone and a stent was placed urgently.  Postoperatively she has had temperatures of up to 103.1.  This morning she is doing much better.    Active Hospital Problems:  Active Hospital Problems    Diagnosis     **Sepsis      Acute pyelonephritis     CHAZ (acute kidney injury)     Right ureteral stone      Plan:   I told patient that once she leaves the hospital and the infection is resolved she must follow-up with the urology clinic to have the stone removed and the stent as well.  She understands my instructions and will comply she states.  I did warn her about the potential complications of needing a stent in the urinary tract for very long as she understands that.    DVT prophylaxis:  Mechanical DVT prophylaxis orders are present.    CODE STATUS:    Code Status (Patient has no pulse and is not breathing): CPR (Attempt to Resuscitate)  Medical Interventions (Patient has pulse or is breathing): Full Support    Disposition:  I expect patient to be discharged home.    Jeaneth Lemon MD

## 2023-12-31 NOTE — SIGNIFICANT NOTE
12/31/23 0178   Plan   Plan JOVANNI, RN met with patient at bedside.  CM updated address in EPIC.  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  Reports no financial needs.  Good support if needed.  Patient plans to return home with no needs at this time.

## 2023-12-31 NOTE — PLAN OF CARE
Goal Outcome Evaluation:           Progress: no change  Outcome Evaluation: Fever. Given tylenol twice during shift. Given zofran and morphine prn. No other changes. NPO since midnight for US abd. Will continue plan of care.

## 2024-01-01 ENCOUNTER — READMISSION MANAGEMENT (OUTPATIENT)
Dept: CALL CENTER | Facility: HOSPITAL | Age: 64
End: 2024-01-01
Payer: COMMERCIAL

## 2024-01-01 VITALS
DIASTOLIC BLOOD PRESSURE: 92 MMHG | BODY MASS INDEX: 33.16 KG/M2 | WEIGHT: 194.22 LBS | HEART RATE: 92 BPM | TEMPERATURE: 98.6 F | OXYGEN SATURATION: 93 % | HEIGHT: 64 IN | SYSTOLIC BLOOD PRESSURE: 121 MMHG | RESPIRATION RATE: 18 BRPM

## 2024-01-01 LAB
ANION GAP SERPL CALCULATED.3IONS-SCNC: 7.1 MMOL/L (ref 5–15)
BUN SERPL-MCNC: 13 MG/DL (ref 8–23)
BUN/CREAT SERPL: 15.5 (ref 7–25)
CALCIUM SPEC-SCNC: 8.1 MG/DL (ref 8.6–10.5)
CHLORIDE SERPL-SCNC: 105 MMOL/L (ref 98–107)
CO2 SERPL-SCNC: 22.9 MMOL/L (ref 22–29)
CREAT SERPL-MCNC: 0.84 MG/DL (ref 0.57–1)
DEPRECATED RDW RBC AUTO: 49.6 FL (ref 37–54)
EGFRCR SERPLBLD CKD-EPI 2021: 78.2 ML/MIN/1.73
ERYTHROCYTE [DISTWIDTH] IN BLOOD BY AUTOMATED COUNT: 13.8 % (ref 12.3–15.4)
GLUCOSE SERPL-MCNC: 145 MG/DL (ref 65–99)
HCT VFR BLD AUTO: 38 % (ref 34–46.6)
HGB BLD-MCNC: 12.4 G/DL (ref 12–15.9)
MCH RBC QN AUTO: 31.5 PG (ref 26.6–33)
MCHC RBC AUTO-ENTMCNC: 32.6 G/DL (ref 31.5–35.7)
MCV RBC AUTO: 96.4 FL (ref 79–97)
PLATELET # BLD AUTO: 83 10*3/MM3 (ref 140–450)
PMV BLD AUTO: 11.2 FL (ref 6–12)
POTASSIUM SERPL-SCNC: 3.7 MMOL/L (ref 3.5–5.2)
RBC # BLD AUTO: 3.94 10*6/MM3 (ref 3.77–5.28)
SODIUM SERPL-SCNC: 135 MMOL/L (ref 136–145)
WBC NRBC COR # BLD AUTO: 4.87 10*3/MM3 (ref 3.4–10.8)

## 2024-01-01 PROCEDURE — 25010000002 HEPARIN (PORCINE) PER 1000 UNITS: Performed by: INTERNAL MEDICINE

## 2024-01-01 PROCEDURE — 85027 COMPLETE CBC AUTOMATED: CPT | Performed by: INTERNAL MEDICINE

## 2024-01-01 PROCEDURE — 25010000002 CEFTRIAXONE PER 250 MG: Performed by: UROLOGY

## 2024-01-01 PROCEDURE — 99231 SBSQ HOSP IP/OBS SF/LOW 25: CPT | Performed by: UROLOGY

## 2024-01-01 PROCEDURE — 99239 HOSP IP/OBS DSCHRG MGMT >30: CPT | Performed by: INTERNAL MEDICINE

## 2024-01-01 PROCEDURE — 80048 BASIC METABOLIC PNL TOTAL CA: CPT | Performed by: INTERNAL MEDICINE

## 2024-01-01 PROCEDURE — 25010000002 KETOROLAC TROMETHAMINE PER 15 MG: Performed by: INTERNAL MEDICINE

## 2024-01-01 RX ORDER — LEVOFLOXACIN 750 MG/1
750 TABLET, FILM COATED ORAL EVERY 24 HOURS
Status: DISCONTINUED | OUTPATIENT
Start: 2024-01-02 | End: 2024-01-01 | Stop reason: HOSPADM

## 2024-01-01 RX ORDER — LEVOFLOXACIN 750 MG/1
750 TABLET, FILM COATED ORAL EVERY 24 HOURS
Qty: 7 TABLET | Refills: 0 | Status: SHIPPED | OUTPATIENT
Start: 2024-01-02 | End: 2024-01-01 | Stop reason: SDUPTHER

## 2024-01-01 RX ORDER — LEVOFLOXACIN 750 MG/1
750 TABLET, FILM COATED ORAL EVERY 24 HOURS
Qty: 7 TABLET | Refills: 0 | Status: SHIPPED | OUTPATIENT
Start: 2024-01-02 | End: 2024-01-09

## 2024-01-01 RX ADMIN — AZITHROMYCIN DIHYDRATE 250 MG: 250 TABLET ORAL at 09:54

## 2024-01-01 RX ADMIN — KETOROLAC TROMETHAMINE 15 MG: 30 INJECTION, SOLUTION INTRAMUSCULAR; INTRAVENOUS at 03:26

## 2024-01-01 RX ADMIN — HEPARIN SODIUM 5000 UNITS: 5000 INJECTION INTRAVENOUS; SUBCUTANEOUS at 05:29

## 2024-01-01 RX ADMIN — Medication 10 ML: at 09:54

## 2024-01-01 RX ADMIN — CEFTRIAXONE SODIUM 2000 MG: 2 INJECTION, POWDER, FOR SOLUTION INTRAMUSCULAR; INTRAVENOUS at 05:29

## 2024-01-01 NOTE — PROGRESS NOTES
[facility]     Progress Note    Patient Name: Gin Hunter  : 1960  MRN: [id number]  Primary Care Physician:  Amber Colin APRN  Date of admission: 2023    Subjective   Subjective     Chief Complaint: She has no complaints today    History of Present Illness  Patient Reports patient came to the hospital a few days ago with a 6 mm stone right proximal ureter with fevers chills and she was diagnosed with a urinary tract infection as well she underwent urgent right ureteral stent placement and now is doing very well she has no pain no discomfort and she states that she is ready to go home.    Review of Systems  Negative  Objective   Objective     Vitals:   Temp:  [98.2 °F (36.8 °C)-98.9 °F (37.2 °C)] 98.8 °F (37.1 °C)  Heart Rate:  [77-93] 77  Resp:  [18-20] 18  BP: (100-134)/(48-69) 129/69  Flow (L/min):  [2] 2    Physical Exam   Awake alert oriented x 3  Afebrile vital signs are stable  Result Review    Result Review:  I have personally reviewed the results from the time of this admission to 2024 09:30 EST and agree with these findings:  [x]  Laboratory list / accordion  []  Microbiology  []  Radiology  []  EKG/Telemetry   []  Cardiology/Vascular   []  Pathology  []  Old records  []  Other:  Most notable findings include: None      Assessment & Plan   Assessment / Plan     Brief Patient Summary:  Gin Hunter is a 63 y.o. female who 6 mm stone right proximal ureter with acute right pyelonephritis and a stent was placed.    Active Hospital Problems:  Active Hospital Problems    Diagnosis     **Sepsis     Acute pyelonephritis     CHAZ (acute kidney injury)     Right ureteral stone      Plan:   I again stressed the patient the need to follow-up with the urology clinic to see one of the local urologist here for definitive treatment of this kidney stone and stent removal I told her the complications arise when the stent stays in too long she understands that and will comply I agree  with discharging patient home anytime.    DVT prophylaxis:  Medical and mechanical DVT prophylaxis orders are present.    CODE STATUS:    Code Status (Patient has no pulse and is not breathing): CPR (Attempt to Resuscitate)  Medical Interventions (Patient has pulse or is breathing): Full Support    Disposition:  I expect patient to be discharged home.    Jeaneth Lemon MD

## 2024-01-01 NOTE — OUTREACH NOTE
Prep Survey      Flowsheet Row Responses   [facility] discharged [facility]? [facility]   Is LACE score < 7 ? Yes   Eligibility Not Eligible   What are the reasons patient is not eligible? Other  [low risk for readmission]   Does the patient have one of the following disease processes/diagnoses(primary or secondary)? Sepsis   Prep survey completed? Yes            Cassandra NUNO - Registered Nurse

## 2024-01-01 NOTE — DISCHARGE SUMMARY
Physician Discharge Summary    Patient Identification  PATIENT IDENTIFICATION    Name: Gin Hunter  :  1960  MRN: [id number]    Admit date: 2023    Discharge date: 2024     Admitting Physician: Rickey Rollins DO     Discharge Physician: Mike Williamson MD     Admission Diagnoses: Sepsis [A41.9]    Hospital Problems:   Principal Problem:  Sepsis   Active Problems:  Problems Addressed this Visit          Genitourinary and Reproductive     Right ureteral stone - Primary    Relevant Orders    Case Request (Completed)     Diagnoses         Codes Comments    Right ureteral stone    -  Primary ICD-10-CM: N20.1  ICD-9-CM: 592.1              Discharged Condition: stable    Consults: IP CONSULT TO UROLOGY    Imaging:   Imaging Results (Last 24 Hours)       Procedure Component Value Units Date/Time    XR Chest PA & Lateral [[id number]] Collected: 23 1203     Updated: 23    Narrative:      PROCEDURE: XR CHEST PA AND LATERAL     COMPARISON: Other, CT, CT ABDOMEN PELVIS W CONTRAST, 2023, 0:28.     INDICATIONS: hypoxia and cough x 2 weeks     FINDINGS:   The heart is normal in size.     The lungs are well-expanded.     Patchy airspace disease in the left lower lobe may represent pneumonia.  Small left effusion is   evident.       Impression:         Patchy airspace disease in the left lower lobe may represent pneumonia.     Small left pleural effusion is evident.            BHAVNA POWELL MD         Electronically Signed and Approved By: BHAVNA POWELL MD on 2023 at 12:03                             Labs:   Lab Results (last 24 hours)       Procedure Component Value Units Date/Time    Blood Culture - Blood, Hand, Right [[id number]]  (Normal) Collected: 23 1044    Specimen: Blood from Hand, Right Updated: 24 1101     Blood Culture No growth at 2 days    Blood Culture - Blood, Hand, Left [[id number]]  (Normal) Collected: 23 1044    Specimen: Blood from Hand, Left  Updated: 01/01/24 1101     Blood Culture No growth at 2 days    Basic Metabolic Panel [[id number]]  (Abnormal) Collected: 01/01/24 0449    Specimen: Blood from Hand, Right Updated: 01/01/24 0531     Glucose 145 mg/dL      BUN 13 mg/dL      Creatinine 0.84 mg/dL      Sodium 135 mmol/L      Potassium 3.7 mmol/L      Chloride 105 mmol/L      CO2 22.9 mmol/L      Calcium 8.1 mg/dL      BUN/Creatinine Ratio 15.5     Anion Gap 7.1 mmol/L      eGFR 78.2 mL/min/1.73     Narrative:      GFR Normal >60  Chronic Kidney Disease <60  Kidney Failure <15      CBC (No Diff) [[id number]]  (Abnormal) Collected: 01/01/24 0449    Specimen: Blood from Hand, Right Updated: 01/01/24 0515     WBC 4.87 10*3/mm3      RBC 3.94 10*6/mm3      Hemoglobin 12.4 g/dL      Hematocrit 38.0 %      MCV 96.4 fL      MCH 31.5 pg      MCHC 32.6 g/dL      RDW 13.8 %      RDW-SD 49.6 fl      MPV 11.2 fL      Platelets 83 10*3/mm3     Urine Culture - Urine, Urine, Clean Catch [[id number]]  (Normal) Collected: 12/30/23 1648    Specimen: Urine, Clean Catch Updated: 12/31/23 1746     Urine Culture No growth              Hospital Course:   64 y/o F with HTN who was transferred from OSH for mgmt of pyelonephritis and obstructive kidney stone. At OSH, her temperature was 100.4 and she was tachycardic prior the chart.  CBC showed a white blood cell count of 5.6 and a hemoglobin of 14.1.  Her platelets are 98.  CMP shows a creatinine of 0.8, glucose 10, and albumin of 2.4 and a total bilirubin of 1.5.  Urinalysis shows 3+ leuk esterase.  Positive nitrite.  4+ bacteria.  She was given 2 g of ceftriaxone. Underwent cystoscopy with right ureteral stent insertion on 12/30.  Cultures here were NGTD but BCx from OSH had pcr identify GBS. She has PCN allergy but based on antibiogram here GBS is 100% sensitive to Levaquin so she was given 7 days of levaquin at discharge to complete a total of 10 days of therapy. She will f/u with urology as instructed    Discharge Exam:  Gen:  up in bed, in NAD  CV:  RRR, no m/r/g, no peripheral edema  Resp: CTAB, no increase work of breathing  Abd: soft, NT, ND, bs present  Neuro: moves all 4 ext, following commands  Ext: no clubbing, cyanosis or edema  Psych: AAOx3, pleasant affect    Disposition: Home    Patient Discharge Medications:      Discharge Medications        New Medications        Instructions Start Date   levoFLOXacin 750 MG tablet  Commonly known as: LEVAQUIN   750 mg, Oral, Every 24 Hours   Start Date: January 2, 2024            Continue These Medications        Instructions Start Date   lansoprazole 15 MG capsule  Commonly known as: PREVACID   15 mg, Oral, Daily              Follow-up Information       Amber Colin, APRN .    Specialty: Nurse Practitioner  Contact information:  [address] [address] [phone]                                 Signed:  Carlos Alberto Williamson MD  [facility]  1/1/2024  11:52 EST      I spent 34 minutes arranging and coordinating discharge and reviewing medications with majority of time spent counseling patient

## 2024-01-01 NOTE — PLAN OF CARE
Goal Outcome Evaluation:         Went to check on pt to let her know that I was working on her discharge paperwork and she was gone out of the room at 1345 and nowhere to be found. Charge nurse was notified. Security was called and  was contacted. Pt left without getting discharge instructions, signing paper or getting IV removed from arm. [place] tried contacting pt but numbers were not working and were not pt's current number.

## 2024-01-01 NOTE — PROGRESS NOTES
[facility]   Hospitalist Progress Note  Date: 2024  Patient Name: Gin Hunter  : 1960  MRN: [id number]  Date of admission: 2023      Subjective   Subjective     Chief Complaint: Follow up for mgmt of obstructive kidney stone    Summary: 62 y/o F with HTN who was transferred from OSH for mgmt of pyelonephritis and obstructive kidney stone. At OSH, her temperature was 100.4 and she was tachycardic prior the chart.  CBC showed a white blood cell count of 5.6 and a hemoglobin of 14.1.  Her platelets are 98.  CMP shows a creatinine of 0.8, glucose 10, and albumin of 2.4 and a total bilirubin of 1.5.  Urinalysis shows 3+ leuk esterase.  Positive nitrite.  4+ bacteria.  She was given 2 g of ceftriaxone. Underwent cystoscopy with right ureteral stent insertion on .  Awaiting cultures.  Doing better.    Interval Followup:   No fevers in over 24 hours. Wants to go home.     Review of Systems  Cardiovascular:  No Chest Pain, No Edema  Respiratory:  +Cough, No Dyspnea, No wheezing  Gastrointestinal:  No Nausea, No Vomiting      Objective   Objective     Vitals:   Temp:  [98.2 °F (36.8 °C)-98.9 °F (37.2 °C)] 98.8 °F (37.1 °C)  Heart Rate:  [77-93] 77  Resp:  [18-20] 18  BP: (100-134)/(48-69) 129/69  Flow (L/min):  [2] 2  Physical Exam    Constitutional: Conversant, NAD   Eyes: Pupils equal and reactive, no conjunctival injections   HENT: NCAT, nares patent, MMM   Respiratory: Clear to auscultation bilaterally, nonlabored respirations    Cardiovascular: RRR, no murmurs, no edema   Gastrointestinal: Positive bowel sounds, soft, nontender, nondistended   Neurologic: Alert, Cranial Nerves grossly intact, speech clear   Skin: Extremities warm, no rashes or wounds    Result Review    Result Review:  I have personally reviewed the following over the last 24 hours (07:00 to 07:00) and agree with the following findings  [x]  Laboratory  CBC          2023    10:44 2023    04:56 2024    04:49    CBC   WBC 15.10  8.27  4.87    RBC 4.39  3.90  3.94    Hemoglobin 14.0  12.4  12.4    Hematocrit 42.6  38.3  38.0    MCV 97.0  98.2  96.4    MCH 31.9  31.8  31.5    MCHC 32.9  32.4  32.6    RDW 14.3  14.3  13.8    Platelets 106  85  83      CMP          12/30/2023    10:44 12/31/2023    04:56 1/1/2024    04:49   CMP   Glucose 113  101  145    BUN 13  13  13    Creatinine 1.30  0.91  0.84    EGFR 46.3  71.0  78.2    Sodium 136  138  135    Potassium 3.9  3.6  3.7    Chloride 105  108  105    Calcium 8.1  7.9  8.1    Total Protein 6.2  5.6     Albumin 2.7  2.4     Globulin 3.5  3.2     Total Bilirubin 1.9  1.6     Alkaline Phosphatase 119  103     AST (SGOT) 65  54     ALT (SGPT) 29  23     Albumin/Globulin Ratio 0.8  0.8     BUN/Creatinine Ratio 10.0  14.3  15.5    Anion Gap 9.1  7.6  7.1      Procalcitonin 0.77    [x]  Microbiology  Blood culture pending  Urine culture pending  [x]  Radiology   []  EKG/Telemetry   []  Cardiology/Vascular   []  Pathology  []  Old records  [x]  Other:    Intake/Output Summary (Last 24 hours) at 1/1/2024 1134  Last data filed at 1/1/2024 0907  Gross per 24 hour   Intake 720 ml   Output 850 ml   Net -130 ml         Assessment & Plan   Assessment / Plan     Assessment/Plan:  Sepsis due to obstructive uropathy and pyelonephritis   Pyelonephritis  Obstructive uropathy  Strep bacteremia  Hypoxia  Left lower lobe pneumonia from unspecified organism  Lifelong smoker  History of hypertension  Hypoalbuminemia, hyperbilirubinemia and mild thrombocytopenia with no formal diagnosis of cirrhosis of the liver?  Elevated LFTs  Acute kidney injury, resolved          Appreciate urology assistance. Will need outpatient follow up in 2-3 weeks.  Fevers coming down. WBC normalized. Blood and urine culture neg here but OSH BCx biofire positive for GBS  PA/lateral chest x-ray with infiltrate in the left lower lobe with small pleural effusion.  Pro-Flavio 0.77.  Given new oxygen demand with cough and x-ray  findings will add azithromycin to complete 5-day course and continue Rocephin 2 g daily, will need total of 10-14 days given bacteremia  Albuterol nebs q6 hours/ Encourage I-S. Wean supplemental oxygen, SpO2 goal >90%  Monitor fever curve; tylenol q6 hours prn  Cr normalized. Over 1L UOP noted. Switch to IV Toradol 15 mg q6 hours severe pain  Declined NRT  Advance diet as tolerated  Add subcu heparin 5000 units for DVT ppx  CBC, BMP in AM    Discussed plan with RN.    DVT prophylaxis:  Medical and mechanical DVT prophylaxis orders are present.    CODE STATUS:   Code Status (Patient has no pulse and is not breathing): CPR (Attempt to Resuscitate)  Medical Interventions (Patient has pulse or is breathing): Full Support    Electronically signed by Mike Williamson MD, 01/01/24, 11:35 AM EST.

## 2024-01-01 NOTE — PLAN OF CARE
Goal Outcome Evaluation:  Plan of Care Reviewed With: patient        Progress: no change  Outcome Evaluation: Patient has had occasional nausea and intermitten pain, but controlled. She slept between meals and care. She ambulates to the bathroom and voids without discomforts. Continue to monitor.

## 2024-01-04 LAB
BACTERIA SPEC AEROBE CULT: NORMAL
BACTERIA SPEC AEROBE CULT: NORMAL

## 2024-01-17 NOTE — PROGRESS NOTES
Enter Query Response Below      Query Response: Acute kidney injury due to sepsis     Electronically signed by iMke Williamson MD, 24, 7:05 PM EST.           If applicable, please update the problem list.     Patient: Gin Hunter        : 1960  Account: [id number]           Admit Date: 2023        How to Respond to this query:       a. Click New Note     b. Answer query within the yellow box.                c. Update the Problem List, if applicable.      If you have any questions about this query contact me at: [phone]     Dr. Williamson:    62 y/o female transferred from outside hospital for management of pyelonephritis and obstructive kidney stone.  Patient also found to have sepsis, acute kidney injury and pneumonia.  Patient underwent cystoscopy with right ureteral stent insertion on .  Patient given rocephin IV -.  Patient discharged on levofloxacin po.      Please clarify the following:    Acute kidney injury due to sepsis  Acute kidney injury due to _______  Other- specify_______  Unable to determine      By submitting this query, we are merely seeking further clarification of documentation to accurately reflect all conditions that you are monitoring, evaluating, treating or that extend the hospitalization or utilize additional resources of care. Please utilize your independent clinical judgment when addressing the question(s) above.     This query and your response, once completed, will be entered into the legal medical record.    Sincerely,  Marjorie Kirby RN, MSN  Clinical Documentation Integrity Program   [email or web address]

## 2024-02-19 ENCOUNTER — HOSPITAL ENCOUNTER (INPATIENT)
Facility: HOSPITAL | Age: 64
LOS: 1 days | Discharge: HOME | End: 2024-02-20
Attending: STUDENT IN AN ORGANIZED HEALTH CARE EDUCATION/TRAINING PROGRAM | Admitting: INTERNAL MEDICINE
Payer: COMMERCIAL

## 2024-02-19 DIAGNOSIS — N20.1 URETERAL CALCULI: Primary | ICD-10-CM

## 2024-02-19 PROBLEM — N30.00 ACUTE CYSTITIS WITHOUT HEMATURIA: Status: ACTIVE | Noted: 2024-02-19

## 2024-02-19 PROCEDURE — 25010000002 CEFTRIAXONE PER 250 MG: Performed by: INTERNAL MEDICINE

## 2024-02-19 PROCEDURE — 99223 1ST HOSP IP/OBS HIGH 75: CPT | Performed by: INTERNAL MEDICINE

## 2024-02-19 PROCEDURE — 99222 1ST HOSP IP/OBS MODERATE 55: CPT | Performed by: UROLOGY

## 2024-02-19 PROCEDURE — 25810000003 SODIUM CHLORIDE 0.9 % SOLUTION: Performed by: INTERNAL MEDICINE

## 2024-02-19 RX ORDER — SODIUM CHLORIDE 0.9 % (FLUSH) 0.9 %
10 SYRINGE (ML) INJECTION EVERY 12 HOURS SCHEDULED
Status: DISCONTINUED | OUTPATIENT
Start: 2024-02-19 | End: 2024-02-20 | Stop reason: HOSPADM

## 2024-02-19 RX ORDER — AMOXICILLIN 250 MG
2 CAPSULE ORAL 2 TIMES DAILY PRN
Status: DISCONTINUED | OUTPATIENT
Start: 2024-02-19 | End: 2024-02-20 | Stop reason: HOSPADM

## 2024-02-19 RX ORDER — ONDANSETRON 4 MG/1
4 TABLET, ORALLY DISINTEGRATING ORAL EVERY 6 HOURS PRN
Status: DISCONTINUED | OUTPATIENT
Start: 2024-02-19 | End: 2024-02-20 | Stop reason: HOSPADM

## 2024-02-19 RX ORDER — HYDROCODONE BITARTRATE AND ACETAMINOPHEN 5; 325 MG/1; MG/1
1 TABLET ORAL EVERY 4 HOURS PRN
Status: DISCONTINUED | OUTPATIENT
Start: 2024-02-19 | End: 2024-02-20 | Stop reason: HOSPADM

## 2024-02-19 RX ORDER — ONDANSETRON 2 MG/ML
4 INJECTION INTRAMUSCULAR; INTRAVENOUS EVERY 6 HOURS PRN
Status: DISCONTINUED | OUTPATIENT
Start: 2024-02-19 | End: 2024-02-20 | Stop reason: HOSPADM

## 2024-02-19 RX ORDER — BISACODYL 10 MG
10 SUPPOSITORY, RECTAL RECTAL DAILY PRN
Status: DISCONTINUED | OUTPATIENT
Start: 2024-02-19 | End: 2024-02-20 | Stop reason: HOSPADM

## 2024-02-19 RX ORDER — SODIUM CHLORIDE 0.9 % (FLUSH) 0.9 %
10 SYRINGE (ML) INJECTION AS NEEDED
Status: DISCONTINUED | OUTPATIENT
Start: 2024-02-19 | End: 2024-02-20 | Stop reason: HOSPADM

## 2024-02-19 RX ORDER — POLYETHYLENE GLYCOL 3350 17 G/17G
17 POWDER, FOR SOLUTION ORAL DAILY PRN
Status: DISCONTINUED | OUTPATIENT
Start: 2024-02-19 | End: 2024-02-20 | Stop reason: HOSPADM

## 2024-02-19 RX ORDER — POTASSIUM CHLORIDE 750 MG/1
20 CAPSULE, EXTENDED RELEASE ORAL ONCE
Status: COMPLETED | OUTPATIENT
Start: 2024-02-19 | End: 2024-02-19

## 2024-02-19 RX ORDER — SODIUM CHLORIDE 9 MG/ML
100 INJECTION, SOLUTION INTRAVENOUS CONTINUOUS
Status: ACTIVE | OUTPATIENT
Start: 2024-02-19 | End: 2024-02-20

## 2024-02-19 RX ORDER — ACETAMINOPHEN 650 MG/1
650 SUPPOSITORY RECTAL EVERY 4 HOURS PRN
Status: DISCONTINUED | OUTPATIENT
Start: 2024-02-19 | End: 2024-02-20 | Stop reason: HOSPADM

## 2024-02-19 RX ORDER — SODIUM CHLORIDE 9 MG/ML
40 INJECTION, SOLUTION INTRAVENOUS AS NEEDED
Status: DISCONTINUED | OUTPATIENT
Start: 2024-02-19 | End: 2024-02-20 | Stop reason: HOSPADM

## 2024-02-19 RX ORDER — BISACODYL 5 MG/1
5 TABLET, DELAYED RELEASE ORAL DAILY PRN
Status: DISCONTINUED | OUTPATIENT
Start: 2024-02-19 | End: 2024-02-20 | Stop reason: HOSPADM

## 2024-02-19 RX ORDER — ACETAMINOPHEN 325 MG/1
650 TABLET ORAL EVERY 4 HOURS PRN
Status: DISCONTINUED | OUTPATIENT
Start: 2024-02-19 | End: 2024-02-20 | Stop reason: HOSPADM

## 2024-02-19 RX ORDER — PANTOPRAZOLE SODIUM 40 MG/1
40 TABLET, DELAYED RELEASE ORAL
Status: DISCONTINUED | OUTPATIENT
Start: 2024-02-20 | End: 2024-02-20 | Stop reason: HOSPADM

## 2024-02-19 RX ORDER — ACETAMINOPHEN 160 MG/5ML
650 SOLUTION ORAL EVERY 4 HOURS PRN
Status: DISCONTINUED | OUTPATIENT
Start: 2024-02-19 | End: 2024-02-20 | Stop reason: HOSPADM

## 2024-02-19 RX ADMIN — Medication 10 ML: at 15:39

## 2024-02-19 RX ADMIN — HYDROCODONE BITARTRATE AND ACETAMINOPHEN 1 TABLET: 5; 325 TABLET ORAL at 18:07

## 2024-02-19 RX ADMIN — POTASSIUM CHLORIDE 20 MEQ: 10 CAPSULE, COATED, EXTENDED RELEASE ORAL at 15:39

## 2024-02-19 RX ADMIN — SODIUM CHLORIDE 100 ML/HR: 9 INJECTION, SOLUTION INTRAVENOUS at 15:38

## 2024-02-19 RX ADMIN — HYDROCODONE BITARTRATE AND ACETAMINOPHEN 1 TABLET: 5; 325 TABLET ORAL at 14:19

## 2024-02-19 RX ADMIN — CEFTRIAXONE SODIUM 1000 MG: 1 INJECTION, POWDER, FOR SOLUTION INTRAMUSCULAR; INTRAVENOUS at 15:38

## 2024-02-19 NOTE — PLAN OF CARE
Goal Outcome Evaluation:      Pt direct admit from [facility]. Up adlib. Pt voiding, urine is dark. PRN pain medication effective per patient. No concerns at this time.  Noris Meade RN

## 2024-02-19 NOTE — CONSULTS
[facility]   UROLOGY Consult Note    Patient Name: Gin Hunter  : 1960  MRN: [id number]  Primary Care Physician:  Amber Colin APRN  Referring Physician: Eden Bautista DO  Date of admission: 2024    Subjective   Subjective     Reason for Consult/ Chief Complaint: Right flank pain, UTI    HPI:  Gin Hunter is a 63 y.o. female transferred from outside hospital after presentation to ER for worsening right-sided flank pain.  Patient also states in addition to the worsening flank pain, she became incontinent and had fever and burning with urination.    Patient known to urology, Dr. Lemon, from prior admission for pyelonephritis, sepsis of urinary origin secondary to a right ureteral stone.  She had emergent right ureteral stent placement 2023.  Patient failed to follow-up with urology.  Right ureteral stent remains in place.    Workup at outside ER reveals white count 8.2; potassium 2.4; creatinine 0.71; UA concerning for infection with TNTC WBCs, RBCs, 3+ bacteria, nitrite negative; culture pending.  Placed on empiric Rocephin.    CT abdomen pelvis without contrast reveals right ureteral stent in place with associated mild hydronephrosis, 5 mm stone along midportion right ureteral stent at the L3-L4 level; other nonobstructing right renal stone 3.5 mm.  No stones in left kidney.    Urology consulted for further evaluation.    Review of Systems   All systems were reviewed and negative except for: H&P and the above    Personal History     Past Medical History:   Diagnosis Date    Cancer     GERD (gastroesophageal reflux disease)        Past Surgical History:   Procedure Laterality Date     SECTION      CYSTOSCOPY W/ URETERAL STENT PLACEMENT Right 2023    Procedure: CYSTOSCOPY, RIGHT URETERAL STENT INSERTION;  Surgeon: Jeaneth Lemon MD;  Location: [facility] MAIN OR;  Service: Urology;  Laterality: Right;       Family History: family history includes Cancer  in her father and mother; Diabetes in her father; Diabetes type I in her father; Emphysema in her brother; Hypertension in her father and mother; Nephrolithiasis in her daughter. Otherwise pertinent FHx was reviewed and not pertinent to current issue.    Social History:  reports that she has been smoking cigarettes. She started smoking about 45 years ago. She has been smoking an average of .25 packs per day. She has never used smokeless tobacco. She reports current alcohol use of about 2.0 standard drinks of alcohol per week. Drug use questions deferred to the physician.    Home Medications:  lansoprazole    Allergies:  Allergies   Allergen Reactions    Penicillin G Anaphylaxis       Objective    Objective     Vitals:   Temp:  [98.8 °F (37.1 °C)] 98.8 °F (37.1 °C)  Heart Rate:  [74] 74  Resp:  [18] 18  BP: (113)/(61) 113/61    Physical Exam:   No acute distress, well-nourished  Awake alert and oriented  Mood normal; affect normal    Result Review    Result Review:  I have personally reviewed the results from the time of this admission to 2/19/2024 17:49 EST and agree with these findings:  [x]  Laboratory  [x]  Microbiology  [x]  Radiology  []  EKG/Telemetry   []  Cardiology/Vascular   []  Pathology  [x]  Old records  []  Other:      Assessment & Plan   Assessment / Plan     Brief Patient Summary:  Gin Hunter is a 63 y.o. female with right ureteral stone and nephrolithiasis status post emergent ureteral stent placement, 12/30/2023 who was transferred from outside hospital secondary to concern of infection and CT scan findings    Active Hospital Problems:  Active Hospital Problems    Diagnosis     **Ureteral calculi     Acute cystitis without hematuria        Plan:   Outside records reviewed, discussed with patient at length    Patient will warrant definitive stone treatment but not eligible until ensuring infection has resolved.  Continue empiric antibiotics; await cultures    Symptom control    Depending on  dispo, stone treatment may be performed on an outpatient basis once infection is treated and acute issues resolved.  Patient notes understanding of this    Patient does understand that her stent is only a temporizing measure and if left in place could cause loss or damage of kidney    Will follow  All questions addressed    Electronically signed by Sirisha Velazquez MD, 02/19/24, 3:20 PM EST.

## 2024-02-19 NOTE — PAYOR COMM NOTE
"Alex Hernández (63 y.o. Female)       Date of Birth   1960    Social Security Number       Address   [address] [place] [address]    Home Phone   [phone]    MRN   [id number]       Mandaeism   None    Marital Status   Unknown                            Admission Date   2/19/24    Admission Type   Elective    Admitting Provider   Jcarlos Kim DO    Attending Provider   Jcarlos Kim DO    Department, Room/Bed   [facility] 5TH FLOOR SURGICAL TELEMETRY UNIT, 512/1       Discharge Date       Discharge Disposition       Discharge Destination                                 Attending Provider: Jcarlos Kim DO    Allergies: Penicillin G    Isolation: None   Infection: None   Code Status: CPR    Ht: 162.6 cm (64\")   Wt: 81.6 kg (179 lb 14.3 oz)    Admission Cmt: None   Principal Problem: Ureteral calculi [N20.1]                   Active Insurance as of 2/19/2024       Primary Coverage       Payor Plan Insurance Group Employer/Plan Group    [employer] [place] [employer] [place]ICA[place]        Payor Plan Address Payor Plan Phone Number Payor Plan Fax Number Effective Dates    [address] [address] [phone]  12/30/2023 - None Entered    [facility] [id number]         Subscriber Name Subscriber Birth Date Member ID       ALEX HERNÁNDEZ 1960 [id number]                     Emergency Contacts        (Rel.) Home Phone Work Phone Mobile Phone    ELVIN CASTELAN (Friend) [phone] -- --        Sunita MONTES  [facility] ,On license of [facility] [phone]  F [phone]         History & Physical        Jcarlos Kim DO at 02/19/24 1421          [facility] HOSPITALIST   HISTORY AND PHYSICAL    Amber Colin, NAVI    Subjective       CHIEF COMPLAINT:     Flank pain    HISTORY OF PRESENT ILLNESS:    62 yo female presenting from outside hospital for flank pain.  Patient at this facility discharged 1/1/2024 for right ureterolithiasis underwent cystoscopy and stent insertion.  Also " treated for pyelonephritis at the time.  Stent has been in place since that time and she presented to outside hospital for 2 to 3 days history of dysuria as well as right flank pain and subjective fever.  CT scan showed right hydronephrosis and 5 mm stone along with the ureteral stent.  OSH discussed with urology who requested transfer here subsequently medicine was asked to admit.      REVIEW OF SYSTEMS:  Please see the above history of present illness for pertinent positives and negatives.      Personal History       Past Medical History:   Diagnosis Date    Cancer     GERD (gastroesophageal reflux disease)      Past Surgical History:   Procedure Laterality Date     SECTION      CYSTOSCOPY W/ URETERAL STENT PLACEMENT Right 2023    Procedure: CYSTOSCOPY, RIGHT URETERAL STENT INSERTION;  Surgeon: Jeaneth Lemon MD;  Location: [facility] OR;  Service: Urology;  Laterality: Right;     Family History   Problem Relation Age of Onset    Cancer Mother     Hypertension Mother     Diabetes Father     Cancer Father     Hypertension Father     Diabetes type I Father     Emphysema Brother     Nephrolithiasis Daughter      Social History     Tobacco Use    Smoking status: Every Day     Packs/day: .25     Types: Cigarettes     Start date: 1978    Smokeless tobacco: Never   Vaping Use    Vaping Use: Never used   Substance Use Topics    Alcohol use: Yes     Alcohol/week: 2.0 standard drinks of alcohol     Types: 2 Cans of beer per week    Drug use: Defer     Medications Prior to Admission   Medication Sig Dispense Refill Last Dose    lansoprazole (PREVACID) 15 MG capsule Take 1 capsule by mouth Daily.   2024     Allergies:  Penicillin g    Immunization History   Administered Date(s) Administered    COVID-19 (MODERNA) 1st,2nd,3rd Dose Monovalent 2021       Objective       Objective    Vital Signs  Temp:  [98.8 °F (37.1 °C)] 98.8 °F (37.1 °C)  Heart Rate:  [74] 74  Resp:  [18] 18  BP: (113)/(61)  "113/61    Flowsheet Rows      Flowsheet Row First Filed Value   Admission Height 162.6 cm (64\") Documented at 02/19/2024 1333   Admission Weight 81.6 kg (179 lb 14.3 oz) Documented at 02/19/2024 1333             Physical Exam:  Physical Exam  Vitals reviewed.   Cardiovascular:      Rate and Rhythm: Normal rate.   Pulmonary:      Effort: No respiratory distress.   Abdominal:      General: There is no distension.      Tenderness: There is right CVA tenderness.   Neurological:      Mental Status: She is alert.             Results Review:   I have reviewed these results below from the time of this admission to 2/19/2024 14:21 EST :  [x]  Laboratory  [x]  Microbiology  [x]  Radiology  []  EKG/Telemetry   []  Cardiology/Vascular   []  Pathology  [x]  Old records  []  Other:  Most notable findings include:  Labs were personally reviewed from outside facility   CT scan reviewed with right hydronephrosis and 5 mm ureterolithiasis  Urine culture from outside facility was obtained but this drug        Assessment / Plan     Assessment & Plan    Active Hospital Problems:  Active Hospital Problems    Diagnosis     **Ureteral calculi      Plan:     UTI POA  Right ureterolithiasis with right hydronephrosis for initial blood  CT abdomen reviewed  -Ucx from OSH pending  -continue Rocephin (has toleratd in past)  -consult Urology     Cirrhosis  -noted on CT abd but also noted on prior Ct abd here  -Liver enzymes stable  -? SANCHEZ, consider outpt GI consult    Hypokalemia  -mag was normal   -replace and f/u     GERD  -Chronic continue PPI    High risk  DVT ppx-SCDs      I discussed the patient's findings and my recommendations with patient and nursing staff at bedside.     Electronically signed by Jcarlos Kim DO, 02/19/24, 14:21 EST.      Note disclaimer: At [facility], we believe that sharing information builds trust and better relationships. You are receiving this note because you recently visited [facility]. It is possible " you will see health information before a provider has talked with you about it. This kind of information can be easy to misunderstand. To help you fully understand what it means for your health, we urge you to discuss this note with your provider.         Electronically signed by Jcarlos Kim DO at 02/19/24 1426       Physician Progress Notes (last 24 hours)  Notes from 02/18/24 1531 through 02/19/24 1531   No notes of this type exist for this encounter.     Jennifer Mendoza MD   Physician  Hospitalist     Significant Note      Signed     Date of Service: 02/19/24 0934  Creation Time: 02/19/24 0934     Signed         Patient is a 63-year-old female with past medical history of hypertension and renal stone s/p stent on 12/2023 who presented initially to [facility] with complaints of right-sided back/flank pain and chills.  She had a ureteral stent placement 2 months ago with Dr. Lemon.  Postoperative note 2 months back showed 7 mm stone.  CT scan now showing stone measuring 9 mm at proximal ureter.  At [facility], patient was hypokalemic and repleted with oral potassium.  Dr. Lemon at [facility] was contacted and he agreed on consulting the patient.  Patient accepted by medicine department, pending transfer.                    Consult Notes (last 24 hours)  Notes from 02/18/24 1531 through 02/19/24 1531   No notes of this type exist for this encounter.        Renal Colic and Kidney Stones [facility] Inpatient Care       Indications Met   Last updated by Sunita Vila RN on 2/19/2024 1526     Review Status Created By   Primary Completed Sunita Vila RN      Criteria Review   Renal Colic and Kidney Stones [facility] Inpatient Care     Overall Determination: Indications Met     Criteria:  [×] Admission is indicated for  1 or more  of the following :      [  ] Hemodynamic instability          2/19/2024  3:26 PM              -- 2/19/2024  3:26 PM by Sunita Vila, LEVIRA --                  98.8                   74                    18                   113/61                  SATS 94%      [×] Urinary tract infection          2/19/2024  3:26 PM              -- 2/19/2024  3:26 PM by Sunita Vila, RN --                  UTI FROM ANOTHER FACILITY CULTURE PENDING                  CONT ROCEPHIN IV     Notes:  -- 2/19/2024  3:26 PM by Sunita Vila, RN --                              Assessment & Plan      Active Hospital Problems:            Active Hospital Problems       Diagnosis        **Ureteral calculi             Plan:             UTI POA      Right ureterolithiasis with right hydronephrosis for initial blood      CT abdomen reviewed      -Ucx from OSH pending      -continue Rocephin (has toleratd in past)      -consult Urology        NS @ 100      Cirrhosis      -noted on CT abd but also noted on prior Ct abd here      -Liver enzymes stable      -? SANCHEZ, consider outpt GI consult            Hypokalemia      -mag was normal       -replace and f/u        NOROC PO PRN-KLOR SARA PO       GERD      -Chronic continue PPI            High risk      DVT ppx-SCDs

## 2024-02-19 NOTE — SIGNIFICANT NOTE
Patient is a 63-year-old female with past medical history of hypertension and renal stone s/p stent on 12/2023 who presented initially to [facility] with complaints of right-sided back/flank pain and chills.  She had a ureteral stent placement 2 months ago with Dr. Lemon.  Postoperative note 2 months back showed 7 mm stone.  CT scan now showing stone measuring 9 mm at proximal ureter.  At [facility], patient was hypokalemic and repleted with oral potassium.  Dr. Lemon at [facility] was contacted and he agreed on consulting the patient.  Patient accepted by medicine department, pending transfer.

## 2024-02-20 ENCOUNTER — PREP FOR SURGERY (OUTPATIENT)
Dept: OTHER | Facility: HOSPITAL | Age: 64
End: 2024-02-20
Payer: COMMERCIAL

## 2024-02-20 ENCOUNTER — TELEPHONE (OUTPATIENT)
Dept: FAMILY MEDICINE CLINIC | Age: 64
End: 2024-02-20
Payer: COMMERCIAL

## 2024-02-20 ENCOUNTER — READMISSION MANAGEMENT (OUTPATIENT)
Dept: CALL CENTER | Facility: HOSPITAL | Age: 64
End: 2024-02-20
Payer: COMMERCIAL

## 2024-02-20 VITALS
OXYGEN SATURATION: 97 % | RESPIRATION RATE: 18 BRPM | SYSTOLIC BLOOD PRESSURE: 116 MMHG | TEMPERATURE: 97.7 F | HEART RATE: 77 BPM | HEIGHT: 64 IN | BODY MASS INDEX: 30.71 KG/M2 | WEIGHT: 179.9 LBS | DIASTOLIC BLOOD PRESSURE: 69 MMHG

## 2024-02-20 DIAGNOSIS — N20.1 RIGHT URETERAL STONE: Primary | ICD-10-CM

## 2024-02-20 LAB
ALBUMIN SERPL-MCNC: 2.2 G/DL (ref 3.5–5.2)
ALBUMIN/GLOB SERPL: 0.7 G/DL
ALP SERPL-CCNC: 88 U/L (ref 39–117)
ALT SERPL W P-5'-P-CCNC: 13 U/L (ref 1–33)
ANION GAP SERPL CALCULATED.3IONS-SCNC: 7.5 MMOL/L (ref 5–15)
AST SERPL-CCNC: 27 U/L (ref 1–32)
BILIRUB SERPL-MCNC: 0.8 MG/DL (ref 0–1.2)
BUN SERPL-MCNC: 7 MG/DL (ref 8–23)
BUN/CREAT SERPL: 11.5 (ref 7–25)
CALCIUM SPEC-SCNC: 7.7 MG/DL (ref 8.6–10.5)
CHLORIDE SERPL-SCNC: 103 MMOL/L (ref 98–107)
CO2 SERPL-SCNC: 25.5 MMOL/L (ref 22–29)
CREAT SERPL-MCNC: 0.61 MG/DL (ref 0.57–1)
DEPRECATED RDW RBC AUTO: 49 FL (ref 37–54)
EGFRCR SERPLBLD CKD-EPI 2021: 100.6 ML/MIN/1.73
ERYTHROCYTE [DISTWIDTH] IN BLOOD BY AUTOMATED COUNT: 13.7 % (ref 12.3–15.4)
GLOBULIN UR ELPH-MCNC: 3.2 GM/DL
GLUCOSE SERPL-MCNC: 89 MG/DL (ref 65–99)
HCT VFR BLD AUTO: 34.8 % (ref 34–46.6)
HGB BLD-MCNC: 11.5 G/DL (ref 12–15.9)
MAGNESIUM SERPL-MCNC: 1.9 MG/DL (ref 1.6–2.4)
MCH RBC QN AUTO: 32 PG (ref 26.6–33)
MCHC RBC AUTO-ENTMCNC: 33 G/DL (ref 31.5–35.7)
MCV RBC AUTO: 96.9 FL (ref 79–97)
PLATELET # BLD AUTO: 150 10*3/MM3 (ref 140–450)
PMV BLD AUTO: 10.6 FL (ref 6–12)
POTASSIUM SERPL-SCNC: 3 MMOL/L (ref 3.5–5.2)
PROT SERPL-MCNC: 5.4 G/DL (ref 6–8.5)
RBC # BLD AUTO: 3.59 10*6/MM3 (ref 3.77–5.28)
SODIUM SERPL-SCNC: 136 MMOL/L (ref 136–145)
WBC NRBC COR # BLD AUTO: 4.74 10*3/MM3 (ref 3.4–10.8)

## 2024-02-20 PROCEDURE — 83735 ASSAY OF MAGNESIUM: CPT | Performed by: INTERNAL MEDICINE

## 2024-02-20 PROCEDURE — 80053 COMPREHEN METABOLIC PANEL: CPT | Performed by: INTERNAL MEDICINE

## 2024-02-20 PROCEDURE — 85027 COMPLETE CBC AUTOMATED: CPT | Performed by: INTERNAL MEDICINE

## 2024-02-20 PROCEDURE — 99239 HOSP IP/OBS DSCHRG MGMT >30: CPT | Performed by: INTERNAL MEDICINE

## 2024-02-20 PROCEDURE — 25810000003 SODIUM CHLORIDE 0.9 % SOLUTION: Performed by: INTERNAL MEDICINE

## 2024-02-20 PROCEDURE — 99231 SBSQ HOSP IP/OBS SF/LOW 25: CPT | Performed by: UROLOGY

## 2024-02-20 RX ORDER — LEVOFLOXACIN 750 MG/1
750 TABLET, FILM COATED ORAL EVERY 24 HOURS
Status: DISCONTINUED | OUTPATIENT
Start: 2024-02-20 | End: 2024-02-20 | Stop reason: HOSPADM

## 2024-02-20 RX ORDER — LEVOFLOXACIN 750 MG/1
750 TABLET, FILM COATED ORAL EVERY 24 HOURS
Qty: 7 TABLET | Refills: 0 | Status: SHIPPED | OUTPATIENT
Start: 2024-02-20 | End: 2024-02-27

## 2024-02-20 RX ORDER — ONDANSETRON 4 MG/1
4 TABLET, ORALLY DISINTEGRATING ORAL EVERY 6 HOURS PRN
Qty: 30 TABLET | Refills: 0 | Status: SHIPPED | OUTPATIENT
Start: 2024-02-20

## 2024-02-20 RX ORDER — POTASSIUM CHLORIDE 750 MG/1
40 CAPSULE, EXTENDED RELEASE ORAL ONCE
Status: COMPLETED | OUTPATIENT
Start: 2024-02-20 | End: 2024-02-20

## 2024-02-20 RX ORDER — LEVOFLOXACIN 5 MG/ML
500 INJECTION, SOLUTION INTRAVENOUS ONCE
OUTPATIENT
Start: 2024-02-20 | End: 2024-02-20

## 2024-02-20 RX ORDER — HYDROCODONE BITARTRATE AND ACETAMINOPHEN 5; 325 MG/1; MG/1
1 TABLET ORAL EVERY 4 HOURS PRN
Qty: 6 TABLET | Refills: 0 | Status: ON HOLD | OUTPATIENT
Start: 2024-02-20 | End: 2024-03-04

## 2024-02-20 RX ADMIN — PANTOPRAZOLE SODIUM 40 MG: 40 TABLET, DELAYED RELEASE ORAL at 05:24

## 2024-02-20 RX ADMIN — SODIUM CHLORIDE 100 ML/HR: 9 INJECTION, SOLUTION INTRAVENOUS at 10:45

## 2024-02-20 RX ADMIN — HYDROCODONE BITARTRATE AND ACETAMINOPHEN 1 TABLET: 5; 325 TABLET ORAL at 00:30

## 2024-02-20 RX ADMIN — POTASSIUM CHLORIDE 40 MEQ: 10 CAPSULE, COATED, EXTENDED RELEASE ORAL at 12:17

## 2024-02-20 RX ADMIN — SODIUM CHLORIDE 100 ML/HR: 9 INJECTION, SOLUTION INTRAVENOUS at 00:34

## 2024-02-20 RX ADMIN — HYDROCODONE BITARTRATE AND ACETAMINOPHEN 1 TABLET: 5; 325 TABLET ORAL at 07:32

## 2024-02-20 RX ADMIN — LEVOFLOXACIN 750 MG: 750 TABLET, FILM COATED ORAL at 13:39

## 2024-02-20 NOTE — OUTREACH NOTE
Prep Survey      Flowsheet Row Responses   [facility] patient discharged from? [facility]   Is LACE score < 7 ? Yes   Eligibility [facility]   Date of Admission 02/19/24   Date of Discharge 02/20/24   Discharge Disposition Home or Self Care   Discharge diagnosis Ureteral calculi   Does the patient have one of the following disease processes/diagnoses(primary or secondary)? Other   Does the patient have Home health ordered? No   Is there a DME ordered? No   Medication alerts for this patient see avs   Prep survey completed? Yes            Darleen HALL - Registered Nurse

## 2024-02-20 NOTE — SIGNIFICANT NOTE
02/20/24 1040   Plan   Plan JOVANNI, RN, met with patient at bedside.  Patient reports daughter, Yamileth, lives with her and assist if needed.  Patient works and provides own IADLs.  Reports no finanacial needs.  Facesheet verified.  Patient is agreeable for PCP set up at discharge and request [facility].  Patient will use [facility] in [facility].  Plans to return home with family at discharge.  Will continue to monitor for possbile discharge needs.

## 2024-02-20 NOTE — TELEPHONE ENCOUNTER
Caller: ANA    Relationship to patient:     Best call back number:     New or established patient?  [x] New  [] Established    Date of discharge: 02.20.2024    Facility discharged from: [facility]    Diagnosis/Symptoms:     Length of stay (If applicable):     Specialty Only: Did you see a [facility] provider?    [] Yes  [] No  If so, who?

## 2024-02-20 NOTE — PLAN OF CARE
Goal Outcome Evaluation:      Education complete. Pt discharging.  Noris Meade RN

## 2024-02-20 NOTE — PROGRESS NOTES
[facility]   Urology Progress Note    Patient Name: Gin Hunter  : 1960  MRN: [id number]  Primary Care Physician:  Amber Colin, NAVI  Date of admission: 2024    Subjective   Subjective       Feeling somewhat better; notes improvement of her incontinence.  No new complaints.    Objective   Objective     Vitals:   Temp:  [97.2 °F (36.2 °C)-98.8 °F (37.1 °C)] 97.3 °F (36.3 °C)  Heart Rate:  [71-82] 75  Resp:  [18] 18  BP: ()/(49-61) 98/57  Physical Exam     Alert and oriented x3  No acute distress  Unlabored respirations         Result Review    Result Review:  I have personally reviewed the results from the time of this admission to 2024 08:22 EST and agree with these findings:  [x]  Laboratory  [x]  Microbiology  []  Radiology  []  EKG/Telemetry   []  Cardiology/Vascular   []  Pathology  []  Old records  []  Other:      Assessment & Plan   Assessment / Plan     Brief Patient Summary:  Gin Hunter is a 63 y.o. female who with right ureteral stone and nephrolithiasis status post emergent ureteral stent placement, 2023 who was transferred from outside hospital secondary to concern of infection and CT scan findings     Active Hospital Problems:  Active Hospital Problems    Diagnosis     **Ureteral calculi     Acute cystitis without hematuria      Plan:   Afebrile, labs reviewed; WBC stable, 4.74  Appears culture still pending from outside ER  Continue antibiotic    Okay for discharge from urology standpoint once appropriate per primary service    Urology office to arrange outpatient OR for definitive stone management; will notify patient of details once discharged and any pending culture results reviewed    Patient does understand that her stent is only a temporizing measure and if left in place could cause loss or damage of kidney    Will sign off, please call with questions      Electronically signed by Sirisha Velazquez MD, 24, 8:22 AM EST.

## 2024-02-20 NOTE — DISCHARGE SUMMARY
[facility] Medicine Services  DISCHARGE SUMMARY        Date of Admission: 2/19/2024    Date of Discharge:  2/20/2024    Length of stay:  LOS: 1 day     Presenting Problem:   Ureteral calculi [N20.1]    Hospital Course     Active Diagnosis During Hospital Stay/Discharge Diagnoses/Course by Diagnoses:    UTI POA  Right ureterolithiasis with right hydronephrosis for initial blood  CT abdomen OSH, 5mm stone around stent and stable hydro  -Ucx from OSH : growing GPC per report, last cx grew proteus only resistant to macrobid  -d/c home on levaquin given allergies, should cover GPC as well   -urology followed, plans for arrangement of outpt OR for stone management after infection treated      Cirrhosis  -noted on CT abd but also noted on prior Ct abd here  -Liver enzymes stable  -rec referral outpt to GI by primary      Hypokalemia  -replaced while here     GERD  -Chronic continue PPI       Active Hospital Problems    Diagnosis  POA   • **Ureteral calculi [N20.1]  Yes   • Acute cystitis without hematuria [N30.00]  Yes      Resolved Hospital Problems   No resolved problems to display.         Hospital Course  Patient is a 63 y.o. female presented with issues as noted above from OSH. She was admitted here but labs including wbc and Cr were stable. She was seen by urology. Since she was stable was able to be d/c home with PO abx and close outpt f/u with urology for definitive stone management. She was in agreement with plan        Procedures Performed:none         Consults:   Consults       Date and Time Order Name Status Description    2/19/2024  1:57 PM Inpatient Urology Consult Completed               Pertinent  and/or Most Recent Results       Pertinent Test Results:     Results from last 7 days   Lab Units 02/20/24  0445   WBC 10*3/mm3 4.74   HEMOGLOBIN g/dL 11.5*   HEMATOCRIT % 34.8   PLATELETS 10*3/mm3 150     Results from last 7 days   Lab Units 02/20/24  0445   SODIUM mmol/L 136   POTASSIUM mmol/L  3.0*   CHLORIDE mmol/L 103   CO2 mmol/L 25.5   BUN mg/dL 7*   CREATININE mg/dL 0.61   CALCIUM mg/dL 7.7*   BILIRUBIN mg/dL 0.8   ALK PHOS U/L 88   ALT (SGPT) U/L 13   AST (SGOT) U/L 27   GLUCOSE mg/dL 89       Microbiology Results (last 10 days)       ** No results found for the last 240 hours. **                Imaging Results (All)       None              Day of Discharge       Condition on Discharge:  stable     Vital Signs  Temp:  [97.2 °F (36.2 °C)-98.8 °F (37.1 °C)] 97.7 °F (36.5 °C)  Heart Rate:  [71-82] 77  Resp:  [18] 18  BP: ()/(49-69) 116/69    Physical Exam:  Physical Exam  Vitals reviewed.   Cardiovascular:      Rate and Rhythm: Normal rate.   Pulmonary:      Effort: No respiratory distress.   Abdominal:      Tenderness: There is no right CVA tenderness.   Neurological:      Mental Status: She is alert and oriented to person, place, and time.               Discharge Disposition  Home or Self Care    Discharge Medications     Discharge Medications        New Medications        Instructions Start Date   HYDROcodone-acetaminophen 5-325 MG per tablet  Commonly known as: NORCO   1 tablet, Oral, Every 4 Hours PRN      levoFLOXacin 750 MG tablet  Commonly known as: LEVAQUIN   750 mg, Oral, Every 24 Hours      ondansetron ODT 4 MG disintegrating tablet  Commonly known as: ZOFRAN-ODT   4 mg, Translingual, Every 6 Hours PRN             Continue These Medications        Instructions Start Date   lansoprazole 15 MG capsule  Commonly known as: PREVACID   15 mg, Oral, Daily               Discharge Diet:   Diet Instructions       Diet: Regular/House Diet; Thin (IDDSI 0)      Discharge Diet: Regular/House Diet    Fluid Consistency: Thin (IDDSI 0)            Activity at Discharge:   Activity Instructions       Activity as Tolerated              Follow-up Appointments  No future appointments.  Additional Instructions for the Follow-ups that You Need to Schedule       Discharge Follow-up with PCP   As directed        Currently Documented PCP:    Amber Colin APRN    PCP Phone Number:    [phone]     Follow Up Details: one week        Discharge Follow-up with Specialty: urology   As directed      Specialty: urology   Follow Up Details: urology office to call and schedule follow up                Test Results Pending at Discharge       Risk for Readmission (LACE) Score: 4 (2/20/2024  6:00 AM)        Time: Discharge 34 min with face-to-face history exam, writing of prescriptions, and documenting discharge data including care coordination with the nursing staff.      Electronically signed by Jcarlos Kim DO, 02/20/24, 12:59 EST.      Note disclaimer: At [facility], we believe that sharing information builds trust and better relationships. You are receiving this note because you recently visited [facility]. It is possible you will see health information before a provider has talked with you about it. This kind of information can be easy to misunderstand. To help you fully understand what it means for your health, we urge you to discuss this note with your provider.

## 2024-02-21 ENCOUNTER — TRANSITIONAL CARE MANAGEMENT TELEPHONE ENCOUNTER (OUTPATIENT)
Dept: CALL CENTER | Facility: HOSPITAL | Age: 64
End: 2024-02-21
Payer: COMMERCIAL

## 2024-02-21 NOTE — OUTREACH NOTE
Call Center TCM Note      Flowsheet Row Responses   [facility] patient discharged from? [facility]   Does the patient have one of the following disease processes/diagnoses(primary or secondary)? Other   TCM attempt successful? Yes  [no verbal release on file]   Call start time 0933   Call end time 0935   Discharge diagnosis Ureteral calculi   Meds reviewed with patient/caregiver? Yes   Is the patient having any side effects they believe may be caused by any medication additions or changes? No   Is the patient taking all medications as directed (includes completed medication regime)? Yes   Comments Hospital f/u on 2/27/24@1000am   Does the patient have an appointment with their PCP within 7-14 days of discharge? Yes   Has home health visited the patient within 72 hours of discharge? N/A   Psychosocial issues? No   Did the patient receive a copy of their discharge instructions? Yes   Nursing interventions Reviewed instructions with patient   What is the patient's perception of their health status since discharge? Improving  [Pt reports she is doing better today--denies pain, no nausea today, denies dysuria.  Drinking plenty of fluids.  Reports plans are to treat infection and then f/u with urology to determine POC regarding stone mgmt]   Is the patient/caregiver able to teach back signs and symptoms related to disease process for when to call PCP? Yes   Is the patient/caregiver able to teach back signs and symptoms related to disease process for when to call 911? Yes   TCM call completed? Yes   Call end time 0935            Ivon Downey RN    2/21/2024, 09:41 EST

## 2024-02-22 ENCOUNTER — TELEPHONE (OUTPATIENT)
Dept: UROLOGY | Facility: CLINIC | Age: 64
End: 2024-02-22
Payer: COMMERCIAL

## 2024-02-26 ENCOUNTER — TELEPHONE (OUTPATIENT)
Dept: UROLOGY | Facility: CLINIC | Age: 64
End: 2024-02-26
Payer: COMMERCIAL

## 2024-02-26 NOTE — TELEPHONE ENCOUNTER
SPOKE TO PT, WHO SAID THAT SHE HAD CALLED EARLIER AND SOMEONE HAD INFORMED HER OF HER SURGERY. ADVISED HER THAT I HAVE ALSO MAILED HER INFO LAST WEEK. PT EXPRESSED UNDERSTANDING AND IS AGREEABLE.

## 2024-02-27 ENCOUNTER — OFFICE VISIT (OUTPATIENT)
Dept: FAMILY MEDICINE CLINIC | Age: 64
End: 2024-02-27
Payer: COMMERCIAL

## 2024-02-27 ENCOUNTER — LAB (OUTPATIENT)
Dept: LAB | Facility: HOSPITAL | Age: 64
End: 2024-02-27
Payer: COMMERCIAL

## 2024-02-27 ENCOUNTER — TRANSCRIBE ORDERS (OUTPATIENT)
Dept: FAMILY MEDICINE CLINIC | Age: 64
End: 2024-02-27

## 2024-02-27 ENCOUNTER — HOSPITAL ENCOUNTER (OUTPATIENT)
Dept: GENERAL RADIOLOGY | Facility: HOSPITAL | Age: 64
Discharge: HOME | End: 2024-02-27
Payer: COMMERCIAL

## 2024-02-27 VITALS
WEIGHT: 190 LBS | SYSTOLIC BLOOD PRESSURE: 160 MMHG | HEIGHT: 64 IN | HEART RATE: 90 BPM | TEMPERATURE: 98.2 F | DIASTOLIC BLOOD PRESSURE: 82 MMHG | OXYGEN SATURATION: 99 % | BODY MASS INDEX: 32.44 KG/M2

## 2024-02-27 DIAGNOSIS — Z87.01 HISTORY OF PNEUMONIA: ICD-10-CM

## 2024-02-27 DIAGNOSIS — M25.512 CHRONIC LEFT SHOULDER PAIN: ICD-10-CM

## 2024-02-27 DIAGNOSIS — R06.02 SHORTNESS OF BREATH: ICD-10-CM

## 2024-02-27 DIAGNOSIS — E87.6 HYPOKALEMIA: ICD-10-CM

## 2024-02-27 DIAGNOSIS — G89.29 CHRONIC LEFT SHOULDER PAIN: ICD-10-CM

## 2024-02-27 DIAGNOSIS — M79.604 BILATERAL LEG PAIN: ICD-10-CM

## 2024-02-27 DIAGNOSIS — N20.0 KIDNEY STONES: Primary | ICD-10-CM

## 2024-02-27 DIAGNOSIS — M79.605 BILATERAL LEG PAIN: ICD-10-CM

## 2024-02-27 DIAGNOSIS — R60.0 LOCALIZED EDEMA: Primary | ICD-10-CM

## 2024-02-27 DIAGNOSIS — M79.604 RIGHT LEG PAIN: ICD-10-CM

## 2024-02-27 DIAGNOSIS — R60.0 LOCALIZED EDEMA: ICD-10-CM

## 2024-02-27 DIAGNOSIS — M79.605 LEFT LEG PAIN: ICD-10-CM

## 2024-02-27 DIAGNOSIS — K74.60 HEPATIC CIRRHOSIS, UNSPECIFIED HEPATIC CIRRHOSIS TYPE, UNSPECIFIED WHETHER ASCITES PRESENT: ICD-10-CM

## 2024-02-27 LAB
ALBUMIN SERPL-MCNC: 2.8 G/DL (ref 3.5–5.2)
ALBUMIN/GLOB SERPL: 0.7 G/DL
ALP SERPL-CCNC: 115 U/L (ref 39–117)
ALT SERPL W P-5'-P-CCNC: 18 U/L (ref 1–33)
ANION GAP SERPL CALCULATED.3IONS-SCNC: 9.4 MMOL/L (ref 5–15)
AST SERPL-CCNC: 50 U/L (ref 1–32)
BILIRUB SERPL-MCNC: 0.7 MG/DL (ref 0–1.2)
BUN SERPL-MCNC: 8 MG/DL (ref 8–23)
BUN/CREAT SERPL: 10.5 (ref 7–25)
CALCIUM SPEC-SCNC: 9 MG/DL (ref 8.6–10.5)
CHLORIDE SERPL-SCNC: 105 MMOL/L (ref 98–107)
CO2 SERPL-SCNC: 27.6 MMOL/L (ref 22–29)
CREAT SERPL-MCNC: 0.76 MG/DL (ref 0.57–1)
EGFRCR SERPLBLD CKD-EPI 2021: 88.2 ML/MIN/1.73
GLOBULIN UR ELPH-MCNC: 4.1 GM/DL
GLUCOSE SERPL-MCNC: 82 MG/DL (ref 65–99)
HAV IGM SERPL QL IA: ABNORMAL
HBV CORE IGM SERPL QL IA: ABNORMAL
HBV SURFACE AG SERPL QL IA: ABNORMAL
HCV AB SER DONR QL: REACTIVE
NT-PROBNP SERPL-MCNC: 104 PG/ML (ref 0–900)
POTASSIUM SERPL-SCNC: 3.4 MMOL/L (ref 3.5–5.2)
PROT SERPL-MCNC: 6.9 G/DL (ref 6–8.5)
SODIUM SERPL-SCNC: 142 MMOL/L (ref 136–145)

## 2024-02-27 PROCEDURE — 80053 COMPREHEN METABOLIC PANEL: CPT

## 2024-02-27 PROCEDURE — 83880 ASSAY OF NATRIURETIC PEPTIDE: CPT

## 2024-02-27 PROCEDURE — 36415 COLL VENOUS BLD VENIPUNCTURE: CPT

## 2024-02-27 PROCEDURE — 80074 ACUTE HEPATITIS PANEL: CPT

## 2024-02-27 PROCEDURE — 73030 X-RAY EXAM OF SHOULDER: CPT

## 2024-02-27 PROCEDURE — 99204 OFFICE O/P NEW MOD 45 MIN: CPT | Performed by: NURSE PRACTITIONER

## 2024-02-27 PROCEDURE — 71046 X-RAY EXAM CHEST 2 VIEWS: CPT

## 2024-02-27 NOTE — PROGRESS NOTES
Chief Complaint  Establish Care and Transitional Care Management ([facility] inpt f/u 2/19/-2/20 for Ureteral calculi )    Subjective          Gin Hunter presents to [facility]     Patient is a 63-year-old female who is here today for her first visit.  She has been hospitalized twice within the last couple of months for kidney stones.  Both of [facility].  First states were reduced December 30 through January 1 and most recent hospitalization was February 19 through the 20th.  She will see urology March 4 for stone removal and new stent placement.  She has had a stent inserted previously.  Patient drinks primarily Mountain Dew which she knows is a contributor to kidney stones.  She is about to complete Levaquin for urinary tract infection.    Recent CT of the abdomen February 19 showed cirrhosis of the liver with a hypodense lesion of the right hepatic dome that is indeterminate.  MRI of the liver was recommended.  Patient reports hepatitis C positive about 25 years ago and completed treatment with interferon.  Previous provider referred her to gastroenterology but was not seen by gastroenterology.  Patient does exhibit some acid reflux or heartburn intermittently.  Patient is agreeable to MRI of the liver and referral to gastroenterology for further evaluation.    Recent hospitalization revealed a very low potassium level and a mildly low hemoglobin at 11.5.    Patient reports chronic left shoulder pain for 3 months unrelated to injury.  Does perform repetitive motions for her job.    Patient states her roommate has a blood pressure cuff available to use at home.    Chronic edema of the bilateral lower extremities not currently wearing compression stockings and current sodium intake is unknown.  She states that legs can be painful at times and some days symptoms are markedly better.  She is a smoker but trying to reduce her intake of nicotine.  Currently smokes 4 or 5  "cigarettes/day.  Does get some intermittent shortness of breath.  Chest x-ray from  indicated possible pneumonia ()     Objective   Vital Signs:   Vitals:    24 1049 24 1145   BP: 141/77 160/82   BP Location: Left arm Left arm   Patient Position: Sitting Sitting   Cuff Size: Adult Adult   Pulse: 95 90   Temp: 98.2 °F (36.8 °C)    TempSrc: Temporal    SpO2: 99%    Weight: 86.2 kg (190 lb)    Height: 162.6 cm (64\")        Wt Readings from Last 3 Encounters:   24 86.2 kg (190 lb)   24 81.6 kg (179 lb 14.3 oz)   23 88.1 kg (194 lb 3.6 oz)      BP Readings from Last 3 Encounters:   24 160/82   24 116/69   24 121/92       Body mass index is 32.61 kg/m².           Physical Exam  Vitals reviewed.   Constitutional:       General: She is not in acute distress.     Appearance: Normal appearance. She is well-developed.   HENT:      Mouth/Throat:      Dentition: Dental caries present.   Cardiovascular:      Rate and Rhythm: Normal rate and regular rhythm.      Pulses:           Posterior tibial pulses are 2+ on the right side and 2+ on the left side.      Heart sounds: Normal heart sounds.   Pulmonary:      Effort: Pulmonary effort is normal.      Breath sounds: Normal breath sounds.   Musculoskeletal:      Right lower le+ Pitting Edema present.      Left lower le+ Pitting Edema present.      Comments: Photos under media   Skin:     General: Skin is warm and dry.   Neurological:      General: No focal deficit present.      Mental Status: She is alert.   Psychiatric:         Attention and Perception: Attention normal.         Mood and Affect: Mood and affect normal.         Behavior: Behavior normal.           Current Outpatient Medications:     HYDROcodone-acetaminophen (NORCO) 5-325 MG per tablet, Take 1 tablet by mouth Every 4 (Four) Hours As Needed for Moderate Pain., Disp: 6 tablet, Rfl: 0    lansoprazole (PREVACID) 15 MG capsule, Take 1 capsule " "by mouth Daily., Disp: , Rfl:     ondansetron ODT (ZOFRAN-ODT) 4 MG disintegrating tablet, Place 1 tablet on the tongue Every 6 (Six) Hours As Needed for Nausea or Vomiting., Disp: 30 tablet, Rfl: 0    potassium chloride 10 MEQ CR tablet, Take 1 tablet by mouth 2 (Two) Times a Day., Disp: 30 tablet, Rfl: 2   Past Medical History:   Diagnosis Date    GERD (gastroesophageal reflux disease)     Kidney stone     Rash     LOWER LEGS, 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    Smoker      Allergies   Allergen Reactions    Penicillin G Anaphylaxis               Result Review :     Common labs          1/1/2024    04:49 2/20/2024    04:45 2/27/2024    12:19   Common Labs   Glucose 145  89  82    BUN 13  7  8    Creatinine 0.84  0.61  0.76    Sodium 135  136  142    Potassium 3.7  3.0  3.4    Chloride 105  103  105    Calcium 8.1  7.7  9.0    Albumin  2.2  2.8    Total Bilirubin  0.8  0.7    Alkaline Phosphatase  88  115    AST (SGOT)  27  50    ALT (SGPT)  13  18    WBC 4.87  4.74     Hemoglobin 12.4  11.5     Hematocrit 38.0  34.8     Platelets 83  150          CT Abdomen Pelvis Without Contrast    Result Date: 2/19/2024  1. Cirrhosis with portal hypertension. There is a 15 mm posterior right hepatic lobe hypodensity which is indeterminate. Recommend follow-up with MRI of the liver with contrast to exclude underlying malignancy. 2. Right ureteral stent in place. There is right hydronephrosis. There is a 5 mm stone along the midportion of the right ureteral stent at the L3-L4 level. There is a nonobstructing stone in the lower pole of the right kidney measuring 3.5 mm. There are no stones in the left kidney. 3. Normal appendix. 4. Right colon wall thickening that may be related to portal gastropathy. 5. Small amount of free fluid in pelvis. Images reviewed, interpreted, dictated and electronically signed by Malik Carmona MD Voice transcription technology ([employer]) is used for the dictation of this note and \"sound-alike\" words " might be erroneously placed despite reviewing this note for accuracy. Errors in dictation may reflect use of voice recognition software and not all errors in transcription may have been detected prior to signing.    CT Abdomen Pelvis Without Contrast    Result Date: 1/14/2024  1. Trace right effusion. 2. Multifocal airspace opacities in the lung bases suggesting an infectious/inflammatory process. 3. Small hiatal hernia. 4. Cirrhosis with splenomegaly. There is trace free fluid in the pelvis. 5. There is a right ureteral stent present. There is a 5.5 mm stone along the proximal aspect of the right ureteral stent. There is no hydronephrosis. 6. Moderate right side stool. There is pandiverticulosis with no evidence of diverticulitis.    XR Chest PA & Lateral    Result Date: 12/31/2023    Patchy airspace disease in the left lower lobe may represent pneumonia.  Small left pleural effusion is evident.     BHAVNA POWELL MD       Electronically Signed and Approved By: BHAVNA POWELL MD on 12/31/2023 at 12:03             US Abdomen Limited    Result Date: 12/31/2023    Right upper quadrant ultrasound demonstrating small, nodular liver consistent with cirrhosis.  There is no evidence of cholelithiasis or duct dilatation.  Tiny brightly echoic structures in the right kidney are consistent with caliceal calcifications seen on CT scan.  The renal collecting system is not appear dilated.     BHAVNA POWELL MD       ELECTRONICALLY SIGNED AND APPROVED BY: BHAVNA POWELL MD ON 12/31/2023 AT 7:35             CT Abdomen Pelvis With Contrast    Result Date: 12/30/2023  There is a 7 mm obstructing right proximal ureteral stone just distal to the ureteropelvic junction with moderate to severe right hydronephrosis and delayed right nephrogram, and moderate right perinephric inflammatory fat stranding. Right nephrolithiasis. Cirrhosis. Splenomegaly. 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 Questionable mild  ileocecal intussusception or ileocecal mass or artifact. Consider follow-up imaging or colonoscopic evaluation. Other findings as above. Images personally reviewed, interpreted and dictated by THEO Castillo.             Social History     Tobacco Use   Smoking Status Every Day    Packs/day: 0.25    Years: 46.00    Additional pack years: 0.00    Total pack years: 11.50    Types: Cigarettes    Start date: 12/1/1978   Smokeless Tobacco Never   Tobacco Comments    Used to smoke 1ppd    INST PER ANESTHESIA PROTOCOL           Assessment and Plan    Diagnoses and all orders for this visit:    1. Kidney stones (Primary)  Assessment & Plan:  See urology as scheduled and recommend decreasing intake of sodas      2. Hepatic cirrhosis, unspecified hepatic cirrhosis type, unspecified whether ascites present  -     Comprehensive metabolic panel; Future  -     Hepatitis panel, acute; Future    3. Localized edema  Assessment & Plan:  To emergency room if worsening.  Further treatment recommendations pending lab results.  Follow-up in 1 month or sooner if concerns.    Orders:  -     US Venous Doppler Lower Extremity Left (duplex); Future  -     US Venous Doppler Lower Extremity Right (duplex); Future  -     Ambulatory Referral to Vascular Surgery    4. Chronic left shoulder pain  -     XR Shoulder 2+ View Left; Future    5. Hypokalemia  -     Comprehensive metabolic panel; Future    6. Shortness of breath  -     proBNP; Future    7. History of pneumonia  -     XR Chest PA & Lateral; Future    8. Left leg pain  -     US Venous Doppler Lower Extremity Left (duplex); Future  -     Ambulatory Referral to Vascular Surgery    9. Right leg pain  -     US Venous Doppler Lower Extremity Right (duplex); Future  -     Ambulatory Referral to Vascular Surgery        Follow Up    No follow-ups on file.  Patient was given instructions and counseling regarding her condition or for health maintenance advice. Please see specific information  pulled into the AVS if appropriate.

## 2024-02-28 DIAGNOSIS — M25.512 CHRONIC LEFT SHOULDER PAIN: Primary | ICD-10-CM

## 2024-02-28 DIAGNOSIS — G89.29 CHRONIC LEFT SHOULDER PAIN: Primary | ICD-10-CM

## 2024-02-28 DIAGNOSIS — E87.6 HYPOKALEMIA: ICD-10-CM

## 2024-02-28 DIAGNOSIS — Z78.0 ASYMPTOMATIC MENOPAUSE: ICD-10-CM

## 2024-02-28 DIAGNOSIS — R74.8 ELEVATED LIVER ENZYMES: ICD-10-CM

## 2024-02-28 DIAGNOSIS — R76.8 POSITIVE HEPATITIS C ANTIBODY TEST: Primary | ICD-10-CM

## 2024-02-28 RX ORDER — POTASSIUM CHLORIDE 750 MG/1
10 TABLET, FILM COATED, EXTENDED RELEASE ORAL 2 TIMES DAILY
Qty: 30 TABLET | Refills: 2 | Status: SHIPPED | OUTPATIENT
Start: 2024-02-28

## 2024-02-28 NOTE — PROGRESS NOTES
Please call patient.  Swelling of extremities not due to heart.  Potassium still a little low.  Recommend starting potassium 10 meq once daily and I will send to pharmacy if agreeable.    Known history of hep c.  Will get pcr hep c level when we recheck liver enzymes and potassium in one month.

## 2024-02-28 NOTE — PROGRESS NOTES
Please call patient.  Arthritis of shoulder.  Consider physical therapy or referral to orthopedics for further management and evaluation.  Bone demineralization could indicate bone thinning or osteoporosis.  Has she ever had bone density test done?

## 2024-02-29 NOTE — ASSESSMENT & PLAN NOTE
To emergency room if worsening.  Further treatment recommendations pending lab results.  Follow-up in 1 month or sooner if concerns.

## 2024-02-29 NOTE — PRE-PROCEDURE INSTRUCTIONS
PRE-OP INSTRUCTIONS REVIEWED WITH PATIENT: FASTING/BATHING/ARRIVAL PROCEDURES.  INSTRUCTED TO TAKE A.M. DAY OF SURGERY: PREVACID, POTASSIUM  UNDERSTANDING VERBALIZED.

## 2024-03-03 ENCOUNTER — ANESTHESIA EVENT (OUTPATIENT)
Dept: PERIOP | Facility: HOSPITAL | Age: 64
End: 2024-03-03
Payer: COMMERCIAL

## 2024-03-04 ENCOUNTER — HOSPITAL ENCOUNTER (OUTPATIENT)
Facility: HOSPITAL | Age: 64
Setting detail: HOSPITAL OUTPATIENT SURGERY
Discharge: HOME | End: 2024-03-04
Attending: UROLOGY | Admitting: UROLOGY
Payer: COMMERCIAL

## 2024-03-04 ENCOUNTER — ANESTHESIA (OUTPATIENT)
Dept: PERIOP | Facility: HOSPITAL | Age: 64
End: 2024-03-04
Payer: COMMERCIAL

## 2024-03-04 ENCOUNTER — APPOINTMENT (OUTPATIENT)
Dept: GENERAL RADIOLOGY | Facility: HOSPITAL | Age: 64
End: 2024-03-04
Payer: COMMERCIAL

## 2024-03-04 VITALS
HEIGHT: 64 IN | RESPIRATION RATE: 16 BRPM | WEIGHT: 184.08 LBS | OXYGEN SATURATION: 99 % | DIASTOLIC BLOOD PRESSURE: 67 MMHG | TEMPERATURE: 97.6 F | HEART RATE: 100 BPM | BODY MASS INDEX: 31.43 KG/M2 | SYSTOLIC BLOOD PRESSURE: 125 MMHG

## 2024-03-04 DIAGNOSIS — N20.1 RIGHT URETERAL STONE: ICD-10-CM

## 2024-03-04 DIAGNOSIS — N13.30 HYDRONEPHROSIS, UNSPECIFIED HYDRONEPHROSIS TYPE: Primary | ICD-10-CM

## 2024-03-04 PROCEDURE — C1758 CATHETER, URETERAL: HCPCS | Performed by: UROLOGY

## 2024-03-04 PROCEDURE — 88300 SURGICAL PATH GROSS: CPT | Performed by: UROLOGY

## 2024-03-04 PROCEDURE — C1769 GUIDE WIRE: HCPCS | Performed by: UROLOGY

## 2024-03-04 PROCEDURE — 52352 CYSTOURETERO W/STONE REMOVE: CPT | Performed by: UROLOGY

## 2024-03-04 PROCEDURE — 25010000002 ONDANSETRON PER 1 MG

## 2024-03-04 PROCEDURE — 25010000002 FENTANYL CITRATE (PF) 50 MCG/ML SOLUTION

## 2024-03-04 PROCEDURE — 25010000002 PROPOFOL 10 MG/ML EMULSION

## 2024-03-04 PROCEDURE — S0260 H&P FOR SURGERY: HCPCS | Performed by: UROLOGY

## 2024-03-04 PROCEDURE — 25010000002 SUGAMMADEX 200 MG/2ML SOLUTION

## 2024-03-04 PROCEDURE — 25810000003 LACTATED RINGERS PER 1000 ML: Performed by: ANESTHESIOLOGY

## 2024-03-04 PROCEDURE — 52332 CYSTOSCOPY AND TREATMENT: CPT | Performed by: UROLOGY

## 2024-03-04 PROCEDURE — C2617 STENT, NON-COR, TEM W/O DEL: HCPCS | Performed by: UROLOGY

## 2024-03-04 PROCEDURE — 25510000001 IOPAMIDOL PER 1 ML: Performed by: UROLOGY

## 2024-03-04 PROCEDURE — 25010000002 MIDAZOLAM PER 1MG: Performed by: ANESTHESIOLOGY

## 2024-03-04 PROCEDURE — 25010000002 LEVOFLOXACIN PER 250 MG: Performed by: UROLOGY

## 2024-03-04 PROCEDURE — 25010000002 DEXAMETHASONE PER 1 MG

## 2024-03-04 PROCEDURE — 76000 FLUOROSCOPY <1 HR PHYS/QHP: CPT

## 2024-03-04 PROCEDURE — 93005 ELECTROCARDIOGRAM TRACING: CPT | Performed by: UROLOGY

## 2024-03-04 PROCEDURE — 82365 CALCULUS SPECTROSCOPY: CPT | Performed by: UROLOGY

## 2024-03-04 DEVICE — URETERAL STENT
Type: IMPLANTABLE DEVICE | Site: URETHRA | Status: FUNCTIONAL
Brand: ASCERTA™

## 2024-03-04 RX ORDER — PHENYLEPHRINE HCL IN 0.9% NACL 1 MG/10 ML
SYRINGE (ML) INTRAVENOUS AS NEEDED
Status: DISCONTINUED | OUTPATIENT
Start: 2024-03-04 | End: 2024-03-04 | Stop reason: SURG

## 2024-03-04 RX ORDER — LEVOFLOXACIN 5 MG/ML
500 INJECTION, SOLUTION INTRAVENOUS ONCE
Status: COMPLETED | OUTPATIENT
Start: 2024-03-04 | End: 2024-03-04

## 2024-03-04 RX ORDER — PHENAZOPYRIDINE HYDROCHLORIDE 200 MG/1
200 TABLET, FILM COATED ORAL 3 TIMES DAILY PRN
Qty: 10 TABLET | Refills: 0 | Status: SHIPPED | OUTPATIENT
Start: 2024-03-04

## 2024-03-04 RX ORDER — OXYCODONE HYDROCHLORIDE 5 MG/1
5 TABLET ORAL
Status: DISCONTINUED | OUTPATIENT
Start: 2024-03-04 | End: 2024-03-04 | Stop reason: HOSPADM

## 2024-03-04 RX ORDER — SODIUM CHLORIDE, SODIUM LACTATE, POTASSIUM CHLORIDE, CALCIUM CHLORIDE 600; 310; 30; 20 MG/100ML; MG/100ML; MG/100ML; MG/100ML
9 INJECTION, SOLUTION INTRAVENOUS CONTINUOUS PRN
Status: DISCONTINUED | OUTPATIENT
Start: 2024-03-04 | End: 2024-03-04 | Stop reason: HOSPADM

## 2024-03-04 RX ORDER — PROMETHAZINE HYDROCHLORIDE 25 MG/1
25 SUPPOSITORY RECTAL ONCE AS NEEDED
Status: DISCONTINUED | OUTPATIENT
Start: 2024-03-04 | End: 2024-03-04 | Stop reason: HOSPADM

## 2024-03-04 RX ORDER — ACETAMINOPHEN 500 MG
1000 TABLET ORAL ONCE
Status: DISCONTINUED | OUTPATIENT
Start: 2024-03-04 | End: 2024-03-04

## 2024-03-04 RX ORDER — PROMETHAZINE HYDROCHLORIDE 12.5 MG/1
25 TABLET ORAL ONCE AS NEEDED
Status: DISCONTINUED | OUTPATIENT
Start: 2024-03-04 | End: 2024-03-04 | Stop reason: HOSPADM

## 2024-03-04 RX ORDER — ONDANSETRON 2 MG/ML
INJECTION INTRAMUSCULAR; INTRAVENOUS AS NEEDED
Status: DISCONTINUED | OUTPATIENT
Start: 2024-03-04 | End: 2024-03-04 | Stop reason: SURG

## 2024-03-04 RX ORDER — TAMSULOSIN HYDROCHLORIDE 0.4 MG/1
1 CAPSULE ORAL DAILY
Qty: 10 CAPSULE | Refills: 0 | Status: SHIPPED | OUTPATIENT
Start: 2024-03-04

## 2024-03-04 RX ORDER — MIDAZOLAM HYDROCHLORIDE 2 MG/2ML
1 INJECTION, SOLUTION INTRAMUSCULAR; INTRAVENOUS ONCE
Status: COMPLETED | OUTPATIENT
Start: 2024-03-04 | End: 2024-03-04

## 2024-03-04 RX ORDER — DEXAMETHASONE SODIUM PHOSPHATE 4 MG/ML
INJECTION, SOLUTION INTRA-ARTICULAR; INTRALESIONAL; INTRAMUSCULAR; INTRAVENOUS; SOFT TISSUE AS NEEDED
Status: DISCONTINUED | OUTPATIENT
Start: 2024-03-04 | End: 2024-03-04 | Stop reason: SURG

## 2024-03-04 RX ORDER — MAGNESIUM HYDROXIDE 1200 MG/15ML
LIQUID ORAL AS NEEDED
Status: DISCONTINUED | OUTPATIENT
Start: 2024-03-04 | End: 2024-03-04 | Stop reason: HOSPADM

## 2024-03-04 RX ORDER — PROMETHAZINE HYDROCHLORIDE 12.5 MG/1
12.5 TABLET ORAL ONCE AS NEEDED
Status: DISCONTINUED | OUTPATIENT
Start: 2024-03-04 | End: 2024-03-04 | Stop reason: HOSPADM

## 2024-03-04 RX ORDER — HYDROCODONE BITARTRATE AND ACETAMINOPHEN 5; 325 MG/1; MG/1
1-2 TABLET ORAL EVERY 6 HOURS PRN
Qty: 12 TABLET | Refills: 0 | Status: SHIPPED | OUTPATIENT
Start: 2024-03-04

## 2024-03-04 RX ORDER — ONDANSETRON 2 MG/ML
4 INJECTION INTRAMUSCULAR; INTRAVENOUS ONCE AS NEEDED
Status: DISCONTINUED | OUTPATIENT
Start: 2024-03-04 | End: 2024-03-04 | Stop reason: HOSPADM

## 2024-03-04 RX ORDER — OXYBUTYNIN CHLORIDE 5 MG/1
5 TABLET ORAL 3 TIMES DAILY PRN
Qty: 12 TABLET | Refills: 0 | Status: SHIPPED | OUTPATIENT
Start: 2024-03-04

## 2024-03-04 RX ORDER — PROPOFOL 10 MG/ML
VIAL (ML) INTRAVENOUS AS NEEDED
Status: DISCONTINUED | OUTPATIENT
Start: 2024-03-04 | End: 2024-03-04 | Stop reason: SURG

## 2024-03-04 RX ORDER — ONDANSETRON 4 MG/1
4 TABLET, ORALLY DISINTEGRATING ORAL ONCE AS NEEDED
Status: DISCONTINUED | OUTPATIENT
Start: 2024-03-04 | End: 2024-03-04 | Stop reason: HOSPADM

## 2024-03-04 RX ORDER — MEPERIDINE HYDROCHLORIDE 25 MG/ML
12.5 INJECTION INTRAMUSCULAR; INTRAVENOUS; SUBCUTANEOUS
Status: DISCONTINUED | OUTPATIENT
Start: 2024-03-04 | End: 2024-03-04 | Stop reason: HOSPADM

## 2024-03-04 RX ORDER — FENTANYL CITRATE 50 UG/ML
INJECTION, SOLUTION INTRAMUSCULAR; INTRAVENOUS AS NEEDED
Status: DISCONTINUED | OUTPATIENT
Start: 2024-03-04 | End: 2024-03-04 | Stop reason: SURG

## 2024-03-04 RX ORDER — IBUPROFEN 600 MG/1
600 TABLET ORAL EVERY 6 HOURS PRN
Status: DISCONTINUED | OUTPATIENT
Start: 2024-03-04 | End: 2024-03-04 | Stop reason: HOSPADM

## 2024-03-04 RX ORDER — ACETAMINOPHEN 325 MG/1
650 TABLET ORAL ONCE
Status: DISCONTINUED | OUTPATIENT
Start: 2024-03-04 | End: 2024-03-04 | Stop reason: HOSPADM

## 2024-03-04 RX ORDER — LIDOCAINE HYDROCHLORIDE 20 MG/ML
INJECTION, SOLUTION EPIDURAL; INFILTRATION; INTRACAUDAL; PERINEURAL AS NEEDED
Status: DISCONTINUED | OUTPATIENT
Start: 2024-03-04 | End: 2024-03-04 | Stop reason: SURG

## 2024-03-04 RX ORDER — ROCURONIUM BROMIDE 10 MG/ML
INJECTION, SOLUTION INTRAVENOUS AS NEEDED
Status: DISCONTINUED | OUTPATIENT
Start: 2024-03-04 | End: 2024-03-04 | Stop reason: SURG

## 2024-03-04 RX ADMIN — LIDOCAINE HYDROCHLORIDE 100 MG: 20 INJECTION, SOLUTION INTRAVENOUS at 12:52

## 2024-03-04 RX ADMIN — PROPOFOL 150 MG: 10 INJECTION, EMULSION INTRAVENOUS at 12:52

## 2024-03-04 RX ADMIN — Medication 100 MCG: at 13:09

## 2024-03-04 RX ADMIN — FENTANYL CITRATE 50 MCG: 50 INJECTION, SOLUTION INTRAMUSCULAR; INTRAVENOUS at 12:52

## 2024-03-04 RX ADMIN — SODIUM CHLORIDE, POTASSIUM CHLORIDE, SODIUM LACTATE AND CALCIUM CHLORIDE 9 ML/HR: 600; 310; 30; 20 INJECTION, SOLUTION INTRAVENOUS at 11:32

## 2024-03-04 RX ADMIN — LEVOFLOXACIN 500 MG: 5 INJECTION, SOLUTION INTRAVENOUS at 12:55

## 2024-03-04 RX ADMIN — SUGAMMADEX 200 MG: 100 INJECTION, SOLUTION INTRAVENOUS at 13:25

## 2024-03-04 RX ADMIN — ROCURONIUM BROMIDE 40 MG: 10 INJECTION, SOLUTION INTRAVENOUS at 12:57

## 2024-03-04 RX ADMIN — FENTANYL CITRATE 50 MCG: 50 INJECTION, SOLUTION INTRAMUSCULAR; INTRAVENOUS at 13:02

## 2024-03-04 RX ADMIN — MIDAZOLAM HYDROCHLORIDE 1 MG: 1 INJECTION, SOLUTION INTRAMUSCULAR; INTRAVENOUS at 12:35

## 2024-03-04 RX ADMIN — ONDANSETRON HYDROCHLORIDE 4 MG: 2 SOLUTION INTRAMUSCULAR; INTRAVENOUS at 13:18

## 2024-03-04 RX ADMIN — DEXAMETHASONE SODIUM PHOSPHATE 4 MG: 4 INJECTION, SOLUTION INTRAMUSCULAR; INTRAVENOUS at 13:06

## 2024-03-04 NOTE — OP NOTE
Preoperative diagnosis  Right ureteral stone, nephrolithiasis    Postoperative diagnosis  Right ureteral stone, nephrolithiasis    Procedure performed  Cystoscopy, right retrograde pyelogram, ureteroscopy, basket stone extraction, ureteral stent exchange    Attending surgeon  Sirisha Velazquez MD     Anesthesia  General    EBL  0 mL    Complications  None    Specimen  Right ureteral and renal stones    Findings  Cystoscopy without abnormality; right ureteral stone amenable to basket extraction; intrarenal stones retrieved with basket; retrograde pyelogram with moderate hydronephrosis.  No stones at conclusion of case  Exchange of 6 x 26 stent with string    Indications  63 y.o. female agreed to undergo the above named procedure after discussion of the alternatives, risks and benefits.   Informed consent was obtained.      Procedure  After informed consent was obtained.  The patient was taken back to the operating suite where general anesthesia was administered.  Once patient was adequately anesthetized she was placed in the dorsolithotomy position.  Her genitals were prepped and draped in the normal sterile fashion.  A rigid cystoscope was inserted gently into the urethral meatus and passed atraumatically into the bladder.  Pan cystoscopy was performed and a 360 degree manner.  No abnormalities were noted.  Patient had bilateral orthotopic ureters.  Focus for the remainder of the procedure was placed on the right side.  The previously placed ureteral stent was grasped and brought out of the meatus.  Sensor wire was threaded through it and stent reduced. Next, semirigid ureteroscope was used to navigate the ureter.  The stone was encountered in the proximal ureter.  It was amenable basket extraction.  Basket used to grasp the calculus and retrieved under direct vision.  It was passed off for chemical analysis.  The ureteroscope was then reinserted and further ureteroscopy up to the level of the renal pelvis did not  reveal any ureteral abnormalities further fragments or calculi.   A second wire was inserted through the semirigid ureteroscope.  Next access sheath was placed over one of the wires.  The flexible ureteroscope was inserted and nephroscopy was performed.  Nephroscopy was performed a systematic manner; there were 2 free-floating calculi noted.  Both were smaller than the ureteral stone and amenable to basket extraction.  Systematically and retrieved.  No other stones were identified.  Contrast dye was injected through the ureteroscope and retrograde pyelogram revealed moderate hydronephrosis.  This also ensured all calyces were thoroughly inspected.  Finally, the ureteroscope was retrieved inspecting the length of the ureter 1 last time which was free of stones.   Finally, a 6 x 26 stent was placed over the wire.  The wire was backloaded through the cystoscope and under direct vision the stent was placed.  Upon retrieval of the wire there was proximal coil within the UPJ and visible distal coil within the bladder.  Patient's bladder was then emptied and the procedure deemed terminated.  She was awoken from general anesthesia and transferred to the PACU in stable condition.       Signed:  Sirisha Velazquez MD  03/04/24  13:30 EST

## 2024-03-04 NOTE — ANESTHESIA POSTPROCEDURE EVALUATION
Patient: Gin Hunter    Procedure Summary       Date: 03/04/24 Room / Location: [facility] OR 07 / [facility] MAIN OR    Anesthesia Start: 1245 Anesthesia Stop: 1335    Procedure: CYSTOSCOPY, URETEROSCOPY, RETROGRADE PYELOGRAM, BASKET STONE RETRIEVAL, STENT exchange, right (Right: Urethra) Diagnosis:       Right ureteral stone      (Right ureteral stone [N20.1])    Surgeons: Sirisha Velazquez MD Provider: Emiliano Henderson MD    Anesthesia Type: general ASA Status: 3            Anesthesia Type: general    Vitals  Vitals Value Taken Time   /64 03/04/24 1410   Temp 36.3 °C (97.3 °F) 03/04/24 1410   Pulse 104 03/04/24 1412   Resp 16 03/04/24 1410   SpO2 96 % 03/04/24 1412   Vitals shown include unfiled device data.        Post Anesthesia Care and Evaluation    Patient location during evaluation: bedside  Patient participation: complete - patient participated  Level of consciousness: awake  Pain management: adequate    Airway patency: patent  PONV Status: none  Cardiovascular status: acceptable and stable  Respiratory status: acceptable  Hydration status: acceptable    Comments: An Anesthesiologist personally participated in the most demanding procedures (including induction and emergence if applicable) in the anesthesia plan, monitored the course of anesthesia administration at frequent intervals and remained physically present and available for immediate diagnosis and treatment of emergencies.

## 2024-03-04 NOTE — DISCHARGE INSTRUCTIONS
DISCHARGE INSTRUCTIONS  Cystoscopy, ureteroscopy, retrograde pyelogram, basket stone retrieval, stent exchange.      For your surgery you had:  General anesthesia (you may have a sore throat for the first 24 hours)  You may experience dizziness, drowsiness, or lightheadedness for several hours following surgery.  Do not stay alone today or tonight.  Limit your activity for 24 hours.  You should not drive or operate machinery, drink alcohol, or sign legally binding documents for 24 hours or while you are taking pain medication.  Resume your diet slowly.  Follow any special dietary instructions you may have been given by your doctor.  Last dose of pain medication was given at:      NOTIFY YOUR DOCTOR IF YOU EXPERIENCE ANY OF THE FOLLOWING:  Temperature greater than 101 degrees Fahrenheit  Shaking Chills  Redness or excessive drainage from incision  Nausea, vomiting and/or pain that is not controlled by prescribed medications  Increase in bleeding or bleeding that is excessive  Unable to urinate in 6 hours after surgery  If unable to reach your doctor, please go to the closest Emergency Room  Strain urine if instructed by physician.  Collect any fragments and take with you on your scheduled appointment. You may pass stone pieces or small blood clots.  Blood in your urine is normal.  It could be light pink to cherry color.  Drink 6-8 glasses of fluid each day to assist with passing of stone fragments.  Back pain is common.  It may feel like a dull ache or back spasm.  Urine will be bloody for several days.  Slight redness or bruising may be noticed on treated side.  If you have difficulty urinating, try sitting in a bathtub of warm water.    If you have a stent, it must be managed by your urologist.  Do NOT forget.  Medications per physician instructions as indicated on Discharge Medication Information Sheet.      SPECIAL INSTRUCTIONS:  Remove stent Friday morning.        URETERAL STENT TEACHING SHEET   Frequently  Asked Questions about Ureteral Stents          What is a ureteral stent?  A ureteral stent is a small, soft tube that is about 10-12 inches long and about as big around as a swizzle stick. It is placed in the ureter, which is the muscular tube that drains urine from the kidney to the bladder.  Each end of the stent is shaped like a pigtail. One end of the tube sits inside the kidney, and the other end sits in the bladder. The purpose of the stent is to hold the ureter open and maintain proper drainage of urine.  It usually is temporary but may be used long term.  This decision will be made by your doctor.  When is the stent used?  A stent is used in a number of situations.  A stent is placed if the doctor is concerned that urine might not drain well through the ureter, due to blockage or as a reaction to surgery.  Stents usually will be placed in a ureter that has been irritated during a procedure that involves removal of a stone.  Stents for this reason are usually left in place for about a week.  These stents ensure that the ureter does not spasm and collapse after the trauma of the procedure.  Does the stent cause any symptoms?  Many patients do feel the stent.  Most commonly there is bladder irritation, typically causing frequent and/or uncomfortable urination and a sensation of pressure over the bladder or in the lower abdomen. Bloody urine is common. Some patients experience pain in the kidney during urination. There can be urinary tract infections associated with the stent so it is very important that you practice good hygiene. Once the stent is removed, all of the symptoms associated with the stent will quite rapidly disappear (oftentimes immediately). While the stent is in place, you may carry on with most normal activities, including work.  Strenuous activity may cause discomfort. Showering is preferred to tub baths while your stent is in place. If you must take a tub bath, do not use bubble baths or oils  as they may lead to infection.       When should the stent be removed?  In some cases the stent can be removed just a few days after the procedure, while in other cases your doctor may recommend that it stay in place for longer periods of time.  You must follow-up with your doctor as directed when you have a stent since stents left in place for too long can lead to blockage, stone formation, urinary infections and ultimately, kidney failure if they are not removed. If your stent passes by itself when you urinate, or you inadvertently pull the string and begin to have large amounts of urine leakage, follow the procedure below to remove the stent.  How is the stent removed?  In some instances, your doctor may decide to leave a string on the stent.  The string is attached to the stent and the string comes out of the urethra (the natural hole where urine exits the body).  The doctor may tell you to remove the stent at home on a given day.  Stents with the string may be removed in a matter of seconds by pulling on the string.  When removing the stent, constant, steady force should be applied, to avoid starting and stopping. Something should be placed below the patient to catch any urine that leaks during removal.  You may choose to remove the stent in the shower, just be sure to have someone close at hand in case you become weak or dizzy.  After the stent is removed, it should be placed in a sealed bag and taken with you to your next office visit.  On the rare occasion that the string breaks and the stent doesn't come out, you should call your doctors office. You should urinate within 4-6 hours after your stent is removed. For this reason, your stent should be removed early in the day.  If you are unable to urinate within 6 hours, call your doctor.   Stents without a string can be removed in the office using a cystoscope. Cystoscopy involves placement of a small flexible tube through the urethra (the hole where urine  exits the body). The procedure, which usually only takes a few minutes and causes little discomfort, is performed in the office. Most patients tolerate having the stent removed using only a topical anesthetic instilled into the urethra. Since no IV line is inserted and there is no anesthesia, you do not have to be accompanied by anyone else and you can eat normally before and after the procedure.  Your doctor may choose to have you return to the hospital to have your stent removed. In this case, you will receive anesthesia and must not eat or drink anything after midnight.

## 2024-03-04 NOTE — ANESTHESIA PREPROCEDURE EVALUATION
Anesthesia Evaluation     Patient summary reviewed and Nursing notes reviewed   no history of anesthetic complications:   NPO Solid Status: > 8 hours  NPO Liquid Status: > 2 hours           Airway   Mallampati: II  TM distance: >3 FB  Neck ROM: full  No difficulty expected  Dental    (+) poor dentition        Pulmonary - normal exam    breath sounds clear to auscultation  (+) a smoker (0.5 ppd) Current, COPD,  Cardiovascular   Exercise tolerance: good (4-7 METS)    ECG reviewed  Rhythm: regular  Rate: normal    (+) hypertension, murmur, hyperlipidemia      Neuro/Psych- negative ROS  GI/Hepatic/Renal/Endo    (+) GERD well controlled, renal disease- stones    Musculoskeletal (-) negative ROS    Abdominal    Substance History - negative use     OB/GYN negative ob/gyn ROS         Other - negative ROS       ROS/Med Hx Other: PAT Nursing Notes unavailable.                Anesthesia Plan    ASA 3     general     (Patient understands anesthesia not responsible for dental damage.)  intravenous induction     Anesthetic plan, risks, benefits, and alternatives have been provided, discussed and informed consent has been obtained with: patient.    Use of blood products discussed with patient .    Plan discussed with CRNA.    CODE STATUS:

## 2024-03-06 LAB
LAB AP CASE REPORT: NORMAL
LAB AP CLINICAL INFORMATION: NORMAL
PATH REPORT.FINAL DX SPEC: NORMAL
PATH REPORT.GROSS SPEC: NORMAL

## 2024-03-11 LAB
QT INTERVAL: 385 MS
QTC INTERVAL: 489 MS

## 2024-03-14 LAB
CA CARBONATE CRY STONE QL IR: 30 %
COLOR STONE: NORMAL
COMPN STONE: NORMAL
LABORATORY COMMENT REPORT: NORMAL
Lab: NORMAL
Lab: NORMAL
PHOTO: NORMAL
SIZE STONE: NORMAL MM
SPEC SOURCE SUBJ: NORMAL
STONE ANALYSIS-IMP: NORMAL
TRI-PHOS MFR STONE: 70 %
WT STONE: 144 MG

## 2024-04-02 ENCOUNTER — TELEPHONE (OUTPATIENT)
Dept: FAMILY MEDICINE CLINIC | Age: 64
End: 2024-04-02
Payer: COMMERCIAL

## 2024-04-12 ENCOUNTER — TELEPHONE (OUTPATIENT)
Dept: FAMILY MEDICINE CLINIC | Age: 64
End: 2024-04-12
Payer: COMMERCIAL

## 2024-04-12 NOTE — TELEPHONE ENCOUNTER
Pt was called due to no-showing appt on 4/10/2024. A vm was left asking if she would like to r/s, and a letter will be sent.

## 2024-04-15 ENCOUNTER — TELEPHONE (OUTPATIENT)
Dept: UROLOGY | Facility: CLINIC | Age: 64
End: 2024-04-15
Payer: COMMERCIAL

## 2024-04-15 NOTE — TELEPHONE ENCOUNTER
----- Message from Jenn Chacon sent at 4/15/2024  3:54 PM EDT -----  Regarding: CX HER 4/17 APPT  PT NO SHOWED HER ULTRASOUND TODAY. SHE IS SCHED TO SEE FARRAH ON 4/17. NO NEED TO SEE WITHOUT IMAGING. THANKS!

## 2024-04-18 NOTE — TELEPHONE ENCOUNTER
3RD CALL TO PT TO [facility] APPT ON 4/17/24 DUE TO PT DID NOT HAVE HER LUCHO DONE/LMOM/ANYTHING ELSE TO DO??

## 2024-04-26 ENCOUNTER — TELEPHONE (OUTPATIENT)
Dept: FAMILY MEDICINE CLINIC | Age: 64
End: 2024-04-26
Payer: COMMERCIAL

## 2024-05-07 ENCOUNTER — TELEPHONE (OUTPATIENT)
Dept: FAMILY MEDICINE CLINIC | Age: 64
End: 2024-05-07
Payer: COMMERCIAL

## (undated) DEVICE — CYSTO/BLADDER IRRIGATION SET, REGULATING CLAMP

## (undated) DEVICE — SKIN PREP TRAY W/CHG: Brand: MEDLINE INDUSTRIES, INC.

## (undated) DEVICE — GLV SURG SENSICARE SLT PF LF 7.5 STRL

## (undated) DEVICE — ENDOSCOPIC VALVE WITH ADAPTER.: Brand: SURSEAL® II

## (undated) DEVICE — TOWEL,OR,DSP,ST,BLUE,STD,4/PK,20PK/CS: Brand: MEDLINE

## (undated) DEVICE — SYS IRR PUMP SGL ACTN VAC SYR 10CC

## (undated) DEVICE — URETERAL STENT
Type: IMPLANTABLE DEVICE | Site: URETER | Status: NON-FUNCTIONAL
Brand: ASCERTA™
Removed: 2023-12-30

## (undated) DEVICE — SOL IRRG H2O BG 3000ML STRL

## (undated) DEVICE — GOWN,REINFORCE,POLY,SIRUS,BREATH SLV,XLG: Brand: MEDLINE

## (undated) DEVICE — CYSTO PACK: Brand: MEDLINE INDUSTRIES, INC.

## (undated) DEVICE — CATH URETRL OPEN END W/CONNECT 5F 70CM

## (undated) DEVICE — NITINOL WIRE WITH HYDROPHILIC TIP: Brand: SENSOR

## (undated) DEVICE — Device

## (undated) DEVICE — GLOVE,SURG,SENSICARE SLT,LF,PF,7: Brand: MEDLINE

## (undated) DEVICE — NITINOL STONE RETRIEVAL BASKET: Brand: ESCAPE